# Patient Record
Sex: FEMALE | Race: WHITE | Employment: FULL TIME | URBAN - METROPOLITAN AREA
[De-identification: names, ages, dates, MRNs, and addresses within clinical notes are randomized per-mention and may not be internally consistent; named-entity substitution may affect disease eponyms.]

---

## 2022-03-01 LAB
ABO, EXTERNAL RESULT: NORMAL
HEP B, EXTERNAL RESULT: NEGATIVE
RH FACTOR, EXTERNAL RESULT: POSITIVE
RPR, EXTERNAL RESULT: NONREACTIVE
RUBELLA TITER, EXTERNAL RESULT: NORMAL

## 2022-03-04 ENCOUNTER — HOSPITAL ENCOUNTER (OUTPATIENT)
Dept: ULTRASOUND IMAGING | Age: 27
Discharge: HOME | End: 2022-03-04
Payer: COMMERCIAL

## 2022-03-04 DIAGNOSIS — O36.80X0 PREGNANCY WITH INCONCLUSIVE FETAL VIABILITY, NOT APPLICABLE OR UNSPECIFIED FETUS: ICD-10-CM

## 2022-03-04 PROCEDURE — 76817 TRANSVAGINAL US OBSTETRIC: CPT

## 2022-03-15 LAB
C. TRACHOMATIS, EXTERNAL RESULT: NEGATIVE
N. GONORRHOEAE, EXTERNAL RESULT: NEGATIVE

## 2022-04-02 ENCOUNTER — HOSPITAL ENCOUNTER (EMERGENCY)
Age: 27
Discharge: HOME | End: 2022-04-03
Attending: STUDENT IN AN ORGANIZED HEALTH CARE EDUCATION/TRAINING PROGRAM
Payer: COMMERCIAL

## 2022-04-02 DIAGNOSIS — O20.0 THREATENED MISCARRIAGE IN EARLY PREGNANCY: Primary | ICD-10-CM

## 2022-04-02 LAB
ABO/RH: NORMAL
ANION GAP SERPL CALCULATED.3IONS-SCNC: 13 MMOL/L (ref 3–16)
ANTIBODY SCREEN: NORMAL
BASOPHILS ABSOLUTE: 0 K/UL (ref 0–0.2)
BASOPHILS RELATIVE PERCENT: 0.2 %
BILIRUBIN URINE: NEGATIVE
BLOOD, URINE: ABNORMAL
BUN BLDV-MCNC: 7 MG/DL (ref 7–20)
CALCIUM SERPL-MCNC: 8.8 MG/DL (ref 8.3–10.6)
CHLORIDE BLD-SCNC: 104 MMOL/L (ref 99–110)
CLARITY: CLEAR
CO2: 21 MMOL/L (ref 21–32)
COLOR: YELLOW
CREAT SERPL-MCNC: <0.5 MG/DL (ref 0.6–1.1)
EOSINOPHILS ABSOLUTE: 0 K/UL (ref 0–0.6)
EOSINOPHILS RELATIVE PERCENT: 0.5 %
EPITHELIAL CELLS, UA: NORMAL /HPF (ref 0–5)
GFR AFRICAN AMERICAN: >60
GFR NON-AFRICAN AMERICAN: >60
GLUCOSE BLD-MCNC: 92 MG/DL (ref 70–99)
GLUCOSE URINE: NEGATIVE MG/DL
GONADOTROPIN, CHORIONIC (HCG) QUANT: NORMAL MIU/ML
HCT VFR BLD CALC: 34.8 % (ref 36–48)
HEMOGLOBIN: 11.7 G/DL (ref 12–16)
KETONES, URINE: NEGATIVE MG/DL
LEUKOCYTE ESTERASE, URINE: NEGATIVE
LYMPHOCYTES ABSOLUTE: 2 K/UL (ref 1–5.1)
LYMPHOCYTES RELATIVE PERCENT: 23.3 %
MCH RBC QN AUTO: 27.3 PG (ref 26–34)
MCHC RBC AUTO-ENTMCNC: 33.5 G/DL (ref 31–36)
MCV RBC AUTO: 81.5 FL (ref 80–100)
MICROSCOPIC EXAMINATION: YES
MONOCYTES ABSOLUTE: 0.5 K/UL (ref 0–1.3)
MONOCYTES RELATIVE PERCENT: 6.2 %
NEUTROPHILS ABSOLUTE: 6 K/UL (ref 1.7–7.7)
NEUTROPHILS RELATIVE PERCENT: 69.8 %
NITRITE, URINE: NEGATIVE
PDW BLD-RTO: 14.3 % (ref 12.4–15.4)
PH UA: 6 (ref 5–8)
PLATELET # BLD: 236 K/UL (ref 135–450)
PMV BLD AUTO: 8.6 FL (ref 5–10.5)
POTASSIUM REFLEX MAGNESIUM: 4 MMOL/L (ref 3.5–5.1)
PROTEIN UA: NEGATIVE MG/DL
RBC # BLD: 4.27 M/UL (ref 4–5.2)
RBC UA: NORMAL /HPF (ref 0–4)
SODIUM BLD-SCNC: 138 MMOL/L (ref 136–145)
SPECIFIC GRAVITY UA: 1.01 (ref 1–1.03)
URINE REFLEX TO CULTURE: ABNORMAL
URINE TYPE: ABNORMAL
UROBILINOGEN, URINE: 0.2 E.U./DL
WBC # BLD: 8.6 K/UL (ref 4–11)
WBC UA: NORMAL /HPF (ref 0–5)

## 2022-04-02 PROCEDURE — 86850 RBC ANTIBODY SCREEN: CPT

## 2022-04-02 PROCEDURE — 99283 EMERGENCY DEPT VISIT LOW MDM: CPT

## 2022-04-02 PROCEDURE — 85025 COMPLETE CBC W/AUTO DIFF WBC: CPT

## 2022-04-02 PROCEDURE — 84702 CHORIONIC GONADOTROPIN TEST: CPT

## 2022-04-02 PROCEDURE — 86901 BLOOD TYPING SEROLOGIC RH(D): CPT

## 2022-04-02 PROCEDURE — 80048 BASIC METABOLIC PNL TOTAL CA: CPT

## 2022-04-02 PROCEDURE — 86900 BLOOD TYPING SEROLOGIC ABO: CPT

## 2022-04-02 PROCEDURE — 81001 URINALYSIS AUTO W/SCOPE: CPT

## 2022-04-02 NOTE — Clinical Note
Simona Kim was seen and treated in our emergency department on 4/2/2022. She may return to work on 04/05/2022. If you have any questions or concerns, please don't hesitate to call.       Christie Lange MD

## 2022-04-03 VITALS
WEIGHT: 245 LBS | BODY MASS INDEX: 39.37 KG/M2 | DIASTOLIC BLOOD PRESSURE: 63 MMHG | TEMPERATURE: 98 F | OXYGEN SATURATION: 100 % | SYSTOLIC BLOOD PRESSURE: 121 MMHG | RESPIRATION RATE: 18 BRPM | HEIGHT: 66 IN | HEART RATE: 88 BPM

## 2022-04-03 ASSESSMENT — ENCOUNTER SYMPTOMS
NAUSEA: 0
COUGH: 0
SORE THROAT: 0
EYE PAIN: 0
VOMITING: 0
SHORTNESS OF BREATH: 0
ABDOMINAL PAIN: 0
DIARRHEA: 0
BACK PAIN: 0

## 2022-04-03 NOTE — ED NOTES
Call placed to [place] [address] [facility] @ [phone]  Re: vaginal bleeding < 20 wks pregnant per Dr. Marrian Bougie Dr. Carmina Landau @ [address]  04/02/22 2240

## 2022-04-03 NOTE — ED PROVIDER NOTES
[facility]  ED  EMERGENCY DEPARTMENT ENCOUNTER      Pt Name: Micheal Lynn  MRN: [id number]  [place] 1995  Date of evaluation: 4/2/2022  Provider: Antonio Jackson MD    CHIEF COMPLAINT       Chief Complaint   Patient presents with    Vaginal Bleeding     starting at 2030 tonight. gone through 2 pads. small clots. 1st pregnancy. 13 weeks pregnant. no cramping     Vaginal bleeding in pregnancy    HISTORY OF PRESENT ILLNESS   (Location/Symptom, Timing/Onset,Context/Setting, Quality, Duration, Modifying Factors, Severity)  Note limiting factors. Micheal Lynn is a 32 y.o. primigravida female who presents to the ED with a chief complaint of vaginal bleeding for the past 1 to 2 hours. Patient quantifies the bleeding as going through 2 pads in the past 2 hours, dark red blood, contain small clots. 13 weeks pregnant by LMP. Had her first trimester ultrasound which confirmed prior IUP. Denies nausea, vomiting, abdominal pain, fevers, dysuria, frequency, other urinary symptoms, diarrhea, constipation, lightheadedness, chest pain, shortness of breath. Symptoms not otherwise alleviated or exacerbated by other factors. NursingNotes were reviewed. REVIEW OF SYSTEMS    (2-9 systems for level 4, 10 or more for level 5)     Review of Systems   Constitutional: Negative for chills and fever. HENT: Negative for congestion and sore throat. Eyes: Negative for pain and visual disturbance. Respiratory: Negative for cough and shortness of breath. Cardiovascular: Negative for chest pain and palpitations. Gastrointestinal: Negative for abdominal pain, diarrhea, nausea and vomiting. Genitourinary: Positive for vaginal bleeding. Negative for difficulty urinating, dysuria, frequency, pelvic pain, urgency, vaginal discharge and vaginal pain. Musculoskeletal: Negative for back pain and neck pain. Skin: Negative for rash and wound.    Neurological: Negative for dizziness, weakness and light-headedness. PAST MEDICAL HISTORY   History reviewed. No pertinent past medical history. SURGICALHISTORY     History reviewed. No pertinent surgical history. CURRENT MEDICATIONS     There are no discharge medications for this patient. ALLERGIES     Other    FAMILY HISTORY     History reviewed. No pertinent family history. SOCIAL HISTORY       Social History     Socioeconomic History    Marital status: Single     Spouse name: None    Number of children: None    Years of education: None    Highest education level: None   Occupational History    None   Tobacco Use    Smoking status: Never Smoker    Smokeless tobacco: Never Used   Substance and Sexual Activity    Alcohol use: No    Drug use: No    Sexual activity: Never   Other Topics Concern    None   Social History Narrative    None     Social Determinants of Health     Financial Resource Strain:     Difficulty of Paying Living Expenses: Not on file   Food Insecurity:     Worried About Running Out of Food in the Last Year: Not on file    Tamica of Food in the Last Year: Not on file   Transportation Needs:     Lack of Transportation (Medical): Not on file    Lack of Transportation (Non-Medical):  Not on file   Physical Activity:     Days of Exercise per Week: Not on file    Minutes of Exercise per Session: Not on file   Stress:     Feeling of Stress : Not on file   Social Connections:     Frequency of Communication with Friends and Family: Not on file    Frequency of Social Gatherings with Friends and Family: Not on file    Attends Oriental orthodox Services: Not on file    Active Member of Clubs or Organizations: Not on file    Attends Club or Organization Meetings: Not on file    Marital Status: Not on file   Intimate Partner Violence:     Fear of Current or Ex-Partner: Not on file    Emotionally Abused: Not on file    Physically Abused: Not on file    Sexually Abused: Not on file   Housing Stability:     Unable to Pay for Housing in the Last Year: Not on file    Number of Places Lived in the Last Year: Not on file    Unstable Housing in the Last Year: Not on file       SCREENINGS    [place] Coma Scale  Eye Opening: Spontaneous  Best Verbal Response: Oriented  Best Motor Response: Obeys commands  [place] Coma Scale Score: 15        PHYSICAL EXAM    (up to 7 for level 4, 8 or more for level 5)     ED Triage Vitals [04/02/22 2116]   BP Temp Temp Source Pulse Resp SpO2 Height Weight   (!) 158/98 98 °F (36.7 °C) Oral 88 18 98 % 5' 6\" (1.676 m) 245 lb (111.1 kg)       General: Alert and oriented appropriately for age, No acute distress. Eye: Normal conjunctiva. Sclera anicteric. HENT: Oral mucosa is moist.  Respiratory: Respirations even and non-labored. Clear to auscultation bilaterally. Cardiovascular: Normal rate, Regular rhythm. Intact peripheral pulses. Gastrointestinal: Soft, Non-tender, Non-distended. Uterine palpable and fundus consistent with dates. : Normal external female genitalia. No blood present in the vaginal vault. Cervix is closed, thick and high. No CMT. No adnexal tenderness. Musculoskeletal: No swelling. Integumentary: Warm, Dry. Neurologic: Alert and appropriate for age. No focal deficits. Psychiatric: Cooperative. DIAGNOSTIC RESULTS       ED BEDSIDE ULTRASOUND:   Point of care pregnancy exam  Procedure note:  Indication: qualitative hCG positive, quantitative hCG positive, pregnant by history, vaginal bleeding    Views:  Transabdominal sagittal: Adequate  Transabdominal transverse: Adequate    Images obtained with findings:   Intrauterine pregnancy: Present  Fetus: Present  Fetal heart rate: 160 bpm  Fetal motion: Present    Impression:   Live intrauterine pregnancy: Present     Images obtained by me, interpreted by me. Images were saved to ultrasound machine.       LABS:  Labs Reviewed   CBC WITH AUTO DIFFERENTIAL - Abnormal; Notable for the following components:       Result Value Hemoglobin 11.7 (*)     Hematocrit 34.8 (*)     All other components within normal limits   BASIC METABOLIC PANEL W/ REFLEX TO MG FOR LOW K - Abnormal; Notable for the following components:    CREATININE <0.5 (*)     All other components within normal limits   URINALYSIS WITH REFLEX TO CULTURE - Abnormal; Notable for the following components:    Blood, Urine LARGE (*)     All other components within normal limits   HCG, QUANTITATIVE, PREGNANCY   MICROSCOPIC URINALYSIS   TYPE AND SCREEN       All other labs were within normal range or not returned as of this dictation. EMERGENCY DEPARTMENT COURSE and DIFFERENTIAL DIAGNOSIS/MDM:   Vitals:    Vitals:    22 2116 22 2154 22 2255 22 2353   BP: (!) 158/98 (!) 143/87 120/80 121/63   Pulse: 88      Resp: 18      Temp: 98 °F (36.7 °C)      TempSrc: Oral      SpO2: 98% 100% 99% 100%   Weight: 245 lb (111.1 kg)      Height: 5' 6\" (1.676 m)            Medical decision makin yo F with c/o painless vaginal bleeding at approximately 14 weeks pregnant. Concern for threatened . Patient has history of confirmed IUP. Unlikely ectopic. Bedside ultrasound confirms live caro intrauterine pregnancy with fetal heart rate of 160 bpm.  Not ectopic. Confirmed to be Rh+. Hemoglobin 11.7, stable, patient hemodynamically stable. Pelvic exam confirms no blood in the vaginal vault. Cervix closed. Presentation consistent with threatened . Given strict pelvic rest precautions, return precautions, instructions to follow-up with OB, spoke with patient's OB, Dr. Alesia Cook and arranged for close follow-up. Patient voiced understanding of this, return precautions given, she is stable for and amenable to discharge home. Discharged home, ambulated steadily from the emergency department upon discharge. CONSULTS:  IP CONSULT TO OB GYN          FINAL IMPRESSION      1.  Threatened miscarriage in early pregnancy          DISPOSITION/PLAN DISPOSITION Decision To Discharge 04/03/2022 12:03:00 AM      PATIENT REFERRED TO:  [facility]  ED  [address]  [address] [facility] [address]  [phone]    If symptoms worsen    Stephania Stein MD  [address]  [address] [facility]  [address] [facility] [address]  [phone]    On 4/4/2022        (Please note that portions of this note were completed with a voice recognition program.Efforts were made to edit the dictations but occasionally words are mis-transcribed.)    Antonio Jackson MD (electronically signed)  Attending Emergency Physician          Antonio Jackson MD  04/03/22 8909

## 2022-07-05 ENCOUNTER — OFFICE VISIT (OUTPATIENT)
Dept: FAMILY MEDICINE CLINIC | Age: 27
End: 2022-07-05
Payer: COMMERCIAL

## 2022-07-05 VITALS
DIASTOLIC BLOOD PRESSURE: 82 MMHG | WEIGHT: 260 LBS | HEIGHT: 66 IN | BODY MASS INDEX: 41.78 KG/M2 | HEART RATE: 90 BPM | OXYGEN SATURATION: 99 % | TEMPERATURE: 97.6 F | SYSTOLIC BLOOD PRESSURE: 132 MMHG

## 2022-07-05 DIAGNOSIS — O09.92 HIGH-RISK PREGNANCY IN SECOND TRIMESTER: ICD-10-CM

## 2022-07-05 DIAGNOSIS — E66.01 CLASS 3 SEVERE OBESITY DUE TO EXCESS CALORIES WITHOUT SERIOUS COMORBIDITY WITH BODY MASS INDEX (BMI) OF 40.0 TO 44.9 IN ADULT (HCC): Primary | ICD-10-CM

## 2022-07-05 PROCEDURE — 99203 OFFICE O/P NEW LOW 30 MIN: CPT | Performed by: NURSE PRACTITIONER

## 2022-07-05 ASSESSMENT — ENCOUNTER SYMPTOMS
CHEST TIGHTNESS: 0
CONSTIPATION: 0
BLOOD IN STOOL: 0
COUGH: 0
SHORTNESS OF BREATH: 0
DIARRHEA: 0

## 2022-07-05 ASSESSMENT — PATIENT HEALTH QUESTIONNAIRE - PHQ9
2. FEELING DOWN, DEPRESSED OR HOPELESS: 0
SUM OF ALL RESPONSES TO PHQ QUESTIONS 1-9: 0
SUM OF ALL RESPONSES TO PHQ9 QUESTIONS 1 & 2: 0
SUM OF ALL RESPONSES TO PHQ QUESTIONS 1-9: 0
1. LITTLE INTEREST OR PLEASURE IN DOING THINGS: 0

## 2022-07-05 NOTE — PROGRESS NOTES
7/5/2022    Chief Complaint   Patient presents with   Harika Davalos Doctor     has not seen pcp for almost 8 yrs, just wanting to get est, she is pregnant        Renetta Butts is a 32 y.o. female, presents today for:      ASSESSMENT/PLAN:    1. Class 3 severe obesity due to excess calories without serious comorbidity with body mass index (BMI) of 40.0 to 44.9 in adult [facility])  Monitor for now due to pregnancy    2. High-risk pregnancy in second trimester  Continue care with Alvaro Kitchen. FHT on ultrasound today. Encouraged healthy diet and daily exercise. Return if symptoms worsen or fail to improve. Presenting today to establish care. Friend recommended practice. Moved from [place] about a year ago. Currently 28 weeks pregnancy- high risk due to weight. Dr. Alvaro Kitchen is OB/ GYN at [facility]. This is her first pregnancy and she is having a girl. No dx of gestational diabetes. 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 She is feeling the baby move hourly. Unable to sleep on her back now. Currently . Trying to watch diet intake and walking daily. Having mild leg swelling due to pregnancy. Hx TB, needed to take 6 months of therapy. Always tests positive on skin tests. PHQ Scores 7/5/2022   PHQ2 Score 0   PHQ9 Score 0     Interpretation of Total Score Depression Severity: 1-4 = Minimal depression, 5-9 = Mild depression, 10-14 = Moderate depression, 15-19 = Moderately severe depression, 20-27 = Severe depression      Lab Results   Component Value Date     04/02/2022    K 4.0 04/02/2022     04/02/2022    CO2 21 04/02/2022    BUN 7 04/02/2022    CREATININE <0.5 (L) 04/02/2022    GLUCOSE 92 04/02/2022    CALCIUM 8.8 04/02/2022    LABGLOM >60 04/02/2022    GFRAA >60 04/02/2022         Review of Systems   Constitutional: Negative. Respiratory: Negative for cough, chest tightness and shortness of breath. Cardiovascular: Negative. Gastrointestinal: Negative for blood in stool, constipation and diarrhea. Genitourinary: Negative. Musculoskeletal: Negative. Skin: Negative. Neurological: Negative for dizziness, tremors, light-headedness and headaches. Hematological: Negative. Psychiatric/Behavioral: Negative for decreased concentration, dysphoric mood, self-injury, sleep disturbance and suicidal ideas. The patient is not nervous/anxious. Current Outpatient Medications on File Prior to Visit   Medication Sig Dispense Refill    Prenatal Vit-Fe Fumarate-FA (PRENATAL VITAMIN PO) Take 1 tablet by mouth daily      [DISCONTINUED] naproxen (NAPROSYN) 500 MG tablet Take 1 tablet by mouth 2 times daily. 30 tablet 0     No current facility-administered medications on file prior to visit. Allergies   Allergen Reactions    Other Hives     shellfish     Past Medical History:   Diagnosis Date    TB (tuberculosis)     completed 6 months oral therapy     Past Surgical History:   Procedure Laterality Date    ADENOIDECTOMY       Social History     Tobacco Use    Smoking status: Never Smoker    Smokeless tobacco: Never Used   Substance Use Topics    Alcohol use: No     History reviewed. No pertinent family history. Vitals:    07/05/22 0930   BP: 132/82   Pulse: 90   Temp: 97.6 °F (36.4 °C)   TempSrc: Infrared   SpO2: 99%   Weight: 260 lb (117.9 kg)   Height: 5' 5.5\" (1.664 m)     Estimated body mass index is 42.61 kg/m² as calculated from the following:    Height as of this encounter: 5' 5.5\" (1.664 m). Weight as of this encounter: 260 lb (117.9 kg). Physical Exam  Vitals reviewed. Constitutional:       Appearance: Normal appearance. HENT:      Head: Normocephalic.       Right Ear: Tympanic membrane and ear canal normal.      Left Ear: Tympanic membrane and ear canal normal.      Nose: Nose normal.      Mouth/Throat:      Mouth: Mucous membranes are moist.   Eyes:      Extraocular Movements: Extraocular movements intact. Conjunctiva/sclera: Conjunctivae normal.      Pupils: Pupils are equal, round, and reactive to light. Neck:      Vascular: No carotid bruit. Cardiovascular:      Rate and Rhythm: Normal rate and regular rhythm. Pulses: Normal pulses. Carotid pulses are 2+ on the right side and 2+ on the left side. Dorsalis pedis pulses are 2+ on the right side and 2+ on the left side. Posterior tibial pulses are 2+ on the right side and 2+ on the left side. Heart sounds: Normal heart sounds. No murmur heard. No gallop. Pulmonary:      Effort: Pulmonary effort is normal.      Breath sounds: Normal breath sounds. Abdominal:      General: Abdomen is flat. Palpations: Abdomen is soft. Tenderness: There is no abdominal tenderness. Hernia: No hernia is present. Comments: Fetal Heart tones on RLQ with ultrasound   Musculoskeletal:         General: Normal range of motion. Cervical back: Normal range of motion. Right lower leg: No edema. Left lower leg: No edema. Lymphadenopathy:      Cervical: No cervical adenopathy. Skin:     General: Skin is warm and dry. Capillary Refill: Capillary refill takes less than 2 seconds. Neurological:      General: No focal deficit present. Mental Status: She is alert and oriented to person, place, and time. Psychiatric:         Mood and Affect: Mood normal.         Behavior: Behavior normal.           Patient's questions answered and concerns addressed. Patient agrees to plan of care.         Electronically signed by RAJ Jo CNP on 7/5/2022 at 1:13 PM

## 2022-07-05 NOTE — ACP (ADVANCE CARE PLANNING)
Advance Care Planning     Advance Care Planning (ACP) Physician/NP/PA Conversation    Date of Conversation: 7/5/2022  Conducted with: Patient with Decision Making Capacity    Healthcare Decision Maker:      Primary Decision Maker: Rolly Ruiz - [phone]    Click here to complete Healthcare Decision Makers including selection of the Healthcare Decision Maker Relationship (ie \"Primary\")  Today we documented Decision Maker(s) consistent with Legal Next of Kin hierarchy. Care Preferences:    Hospitalization: \"If your health worsens and it becomes clear that your chance of recovery is unlikely, what would be your preference regarding hospitalization? \"  The patient would prefer hospitalization. Ventilation: \"If you were unable to breath on your own and your chance of recovery was unlikely, what would be your preference about the use of a ventilator (breathing machine) if it was available to you? \"  The patient is unsure. Resuscitation: \"In the event your heart stopped as a result of an underlying serious health condition, would you want attempts made to restart your heart, or would you prefer a natural death? \"  The patient is unsure.     treatment goals, benefit/burden of treatment options, artificial nutrition, ventilation preferences, hospitalization preferences, resuscitation preferences, end of life care preferences (vegetative state/imminent death) and hospice care    Conversation Outcomes / Follow-Up Plan:  ACP complete - no further action today  Reviewed DNR/DNI and patient elects Full Code (Attempt Resuscitation)    Length of Voluntary ACP Conversation in minutes:  <16 minutes (Non-Billable)    Prema Cook, APRN - CNP

## 2022-09-12 LAB — GBS, EXTERNAL RESULT: NEGATIVE

## 2022-09-19 ENCOUNTER — HOSPITAL ENCOUNTER (INPATIENT)
Age: 27
LOS: 2 days | Discharge: HOME | End: 2022-09-21
Attending: OBSTETRICS & GYNECOLOGY | Admitting: OBSTETRICS & GYNECOLOGY
Payer: COMMERCIAL

## 2022-09-19 ENCOUNTER — ANESTHESIA EVENT (OUTPATIENT)
Dept: LABOR AND DELIVERY | Age: 27
End: 2022-09-19
Payer: COMMERCIAL

## 2022-09-19 ENCOUNTER — ANESTHESIA (OUTPATIENT)
Dept: LABOR AND DELIVERY | Age: 27
End: 2022-09-19
Payer: COMMERCIAL

## 2022-09-19 DIAGNOSIS — Z98.891 S/P CESAREAN SECTION: Primary | ICD-10-CM

## 2022-09-19 PROBLEM — O13.3 GESTATIONAL HYPERTENSION W/O SIGNIFICANT PROTEINURIA IN 3RD TRIMESTER: Status: ACTIVE | Noted: 2022-09-19

## 2022-09-19 LAB
A/G RATIO: 1.1 (ref 1.1–2.2)
ABO/RH: NORMAL
ALBUMIN SERPL-MCNC: 3.6 G/DL (ref 3.4–5)
ALP BLD-CCNC: 119 U/L (ref 40–129)
ALT SERPL-CCNC: 6 U/L (ref 10–40)
AMPHETAMINE SCREEN, URINE: NORMAL
ANION GAP SERPL CALCULATED.3IONS-SCNC: 11 MMOL/L (ref 3–16)
ANTIBODY SCREEN: NORMAL
AST SERPL-CCNC: 15 U/L (ref 15–37)
BARBITURATE SCREEN URINE: NORMAL
BASOPHILS ABSOLUTE: 0 K/UL (ref 0–0.2)
BASOPHILS RELATIVE PERCENT: 0.1 %
BENZODIAZEPINE SCREEN, URINE: NORMAL
BILIRUB SERPL-MCNC: 0.3 MG/DL (ref 0–1)
BUN BLDV-MCNC: 7 MG/DL (ref 7–20)
BUPRENORPHINE URINE: NORMAL
CALCIUM SERPL-MCNC: 8.7 MG/DL (ref 8.3–10.6)
CANNABINOID SCREEN URINE: NORMAL
CHLORIDE BLD-SCNC: 100 MMOL/L (ref 99–110)
CO2: 21 MMOL/L (ref 21–32)
COCAINE METABOLITE SCREEN URINE: NORMAL
CREAT SERPL-MCNC: <0.5 MG/DL (ref 0.6–1.1)
CREATININE URINE: 85 MG/DL (ref 28–259)
EOSINOPHILS ABSOLUTE: 0.1 K/UL (ref 0–0.6)
EOSINOPHILS RELATIVE PERCENT: 0.5 %
FENTANYL SCREEN, URINE: NORMAL
GFR AFRICAN AMERICAN: >60
GFR NON-AFRICAN AMERICAN: >60
GLUCOSE BLD-MCNC: 91 MG/DL (ref 70–99)
HCT VFR BLD CALC: 34.2 % (ref 36–48)
HEMOGLOBIN: 11.3 G/DL (ref 12–16)
LYMPHOCYTES ABSOLUTE: 2.2 K/UL (ref 1–5.1)
LYMPHOCYTES RELATIVE PERCENT: 22.4 %
Lab: NORMAL
MCH RBC QN AUTO: 27.1 PG (ref 26–34)
MCHC RBC AUTO-ENTMCNC: 33.1 G/DL (ref 31–36)
MCV RBC AUTO: 82 FL (ref 80–100)
METHADONE SCREEN, URINE: NORMAL
MONOCYTES ABSOLUTE: 0.6 K/UL (ref 0–1.3)
MONOCYTES RELATIVE PERCENT: 6.4 %
NEUTROPHILS ABSOLUTE: 7 K/UL (ref 1.7–7.7)
NEUTROPHILS RELATIVE PERCENT: 70.6 %
OPIATE SCREEN URINE: NORMAL
OXYCODONE URINE: NORMAL
PDW BLD-RTO: 16.3 % (ref 12.4–15.4)
PH UA: 7
PHENCYCLIDINE SCREEN URINE: NORMAL
PLATELET # BLD: 226 K/UL (ref 135–450)
PMV BLD AUTO: 8.7 FL (ref 5–10.5)
POTASSIUM SERPL-SCNC: 3.9 MMOL/L (ref 3.5–5.1)
PROTEIN PROTEIN: 14 MG/DL
PROTEIN/CREAT RATIO: 0.2 MG/DL
RBC # BLD: 4.17 M/UL (ref 4–5.2)
SODIUM BLD-SCNC: 132 MMOL/L (ref 136–145)
TOTAL PROTEIN: 6.9 G/DL (ref 6.4–8.2)
WBC # BLD: 9.9 K/UL (ref 4–11)

## 2022-09-19 PROCEDURE — 6360000002 HC RX W HCPCS: Performed by: NURSE ANESTHETIST, CERTIFIED REGISTERED

## 2022-09-19 PROCEDURE — 2500000003 HC RX 250 WO HCPCS: Performed by: NURSE ANESTHETIST, CERTIFIED REGISTERED

## 2022-09-19 PROCEDURE — 36415 COLL VENOUS BLD VENIPUNCTURE: CPT

## 2022-09-19 PROCEDURE — 85025 COMPLETE CBC W/AUTO DIFF WBC: CPT

## 2022-09-19 PROCEDURE — 2580000003 HC RX 258: Performed by: OBSTETRICS & GYNECOLOGY

## 2022-09-19 PROCEDURE — 7100000000 HC PACU RECOVERY - FIRST 15 MIN: Performed by: OBSTETRICS & GYNECOLOGY

## 2022-09-19 PROCEDURE — 3700000000 HC ANESTHESIA ATTENDED CARE: Performed by: OBSTETRICS & GYNECOLOGY

## 2022-09-19 PROCEDURE — 80053 COMPREHEN METABOLIC PANEL: CPT

## 2022-09-19 PROCEDURE — 1220000000 HC SEMI PRIVATE OB R&B

## 2022-09-19 PROCEDURE — 3609079900 HC CESAREAN SECTION: Performed by: OBSTETRICS & GYNECOLOGY

## 2022-09-19 PROCEDURE — 3700000001 HC ADD 15 MINUTES (ANESTHESIA): Performed by: OBSTETRICS & GYNECOLOGY

## 2022-09-19 PROCEDURE — 82570 ASSAY OF URINE CREATININE: CPT

## 2022-09-19 PROCEDURE — 2709999900 HC NON-CHARGEABLE SUPPLY: Performed by: OBSTETRICS & GYNECOLOGY

## 2022-09-19 PROCEDURE — 86780 TREPONEMA PALLIDUM: CPT

## 2022-09-19 PROCEDURE — 6360000002 HC RX W HCPCS: Performed by: OBSTETRICS & GYNECOLOGY

## 2022-09-19 PROCEDURE — 84156 ASSAY OF PROTEIN URINE: CPT

## 2022-09-19 PROCEDURE — 86901 BLOOD TYPING SEROLOGIC RH(D): CPT

## 2022-09-19 PROCEDURE — 86850 RBC ANTIBODY SCREEN: CPT

## 2022-09-19 PROCEDURE — 7100000001 HC PACU RECOVERY - ADDTL 15 MIN: Performed by: OBSTETRICS & GYNECOLOGY

## 2022-09-19 PROCEDURE — 86900 BLOOD TYPING SEROLOGIC ABO: CPT

## 2022-09-19 PROCEDURE — 80307 DRUG TEST PRSMV CHEM ANLYZR: CPT

## 2022-09-19 RX ORDER — MORPHINE SULFATE 0.5 MG/ML
INJECTION, SOLUTION EPIDURAL; INTRATHECAL; INTRAVENOUS PRN
Status: DISCONTINUED | OUTPATIENT
Start: 2022-09-19 | End: 2022-09-28 | Stop reason: SDUPTHER

## 2022-09-19 RX ORDER — FAMOTIDINE 20 MG/1
20 TABLET, FILM COATED ORAL 2 TIMES DAILY
Status: DISCONTINUED | OUTPATIENT
Start: 2022-09-19 | End: 2022-09-21 | Stop reason: HOSPADM

## 2022-09-19 RX ORDER — IBUPROFEN 800 MG/1
800 TABLET ORAL EVERY 8 HOURS
Status: DISCONTINUED | OUTPATIENT
Start: 2022-09-20 | End: 2022-09-21 | Stop reason: HOSPADM

## 2022-09-19 RX ORDER — DIPHENHYDRAMINE HYDROCHLORIDE 50 MG/ML
25 INJECTION INTRAMUSCULAR; INTRAVENOUS EVERY 6 HOURS PRN
Status: DISCONTINUED | OUTPATIENT
Start: 2022-09-19 | End: 2022-09-21 | Stop reason: HOSPADM

## 2022-09-19 RX ORDER — ONDANSETRON 2 MG/ML
4 INJECTION INTRAMUSCULAR; INTRAVENOUS EVERY 6 HOURS PRN
Status: DISCONTINUED | OUTPATIENT
Start: 2022-09-19 | End: 2022-09-21 | Stop reason: HOSPADM

## 2022-09-19 RX ORDER — LANOLIN 100 %
OINTMENT (GRAM) TOPICAL
Status: DISCONTINUED | OUTPATIENT
Start: 2022-09-19 | End: 2022-09-21 | Stop reason: HOSPADM

## 2022-09-19 RX ORDER — CEPHALEXIN 250 MG/1
500 CAPSULE ORAL EVERY 8 HOURS SCHEDULED
Status: DISCONTINUED | OUTPATIENT
Start: 2022-09-19 | End: 2022-09-21 | Stop reason: HOSPADM

## 2022-09-19 RX ORDER — OXYTOCIN 10 [USP'U]/ML
INJECTION, SOLUTION INTRAMUSCULAR; INTRAVENOUS PRN
Status: DISCONTINUED | OUTPATIENT
Start: 2022-09-19 | End: 2022-09-28 | Stop reason: SDUPTHER

## 2022-09-19 RX ORDER — SODIUM CHLORIDE, SODIUM LACTATE, POTASSIUM CHLORIDE, CALCIUM CHLORIDE 600; 310; 30; 20 MG/100ML; MG/100ML; MG/100ML; MG/100ML
INJECTION, SOLUTION INTRAVENOUS CONTINUOUS
Status: DISCONTINUED | OUTPATIENT
Start: 2022-09-19 | End: 2022-09-19

## 2022-09-19 RX ORDER — FAMOTIDINE 10 MG/ML
INJECTION, SOLUTION INTRAVENOUS PRN
Status: DISCONTINUED | OUTPATIENT
Start: 2022-09-19 | End: 2022-09-28 | Stop reason: SDUPTHER

## 2022-09-19 RX ORDER — OXYCODONE HYDROCHLORIDE 5 MG/1
5 TABLET ORAL EVERY 4 HOURS PRN
Status: DISCONTINUED | OUTPATIENT
Start: 2022-09-19 | End: 2022-09-21 | Stop reason: HOSPADM

## 2022-09-19 RX ORDER — ACETAMINOPHEN 500 MG
1000 TABLET ORAL EVERY 8 HOURS
Status: DISCONTINUED | OUTPATIENT
Start: 2022-09-19 | End: 2022-09-21 | Stop reason: HOSPADM

## 2022-09-19 RX ORDER — POLYETHYLENE GLYCOL 3350 17 G/17G
17 POWDER, FOR SOLUTION ORAL DAILY
Status: DISCONTINUED | OUTPATIENT
Start: 2022-09-19 | End: 2022-09-21 | Stop reason: HOSPADM

## 2022-09-19 RX ORDER — EPHEDRINE SULFATE 50 MG/ML
INJECTION INTRAVENOUS PRN
Status: DISCONTINUED | OUTPATIENT
Start: 2022-09-19 | End: 2022-09-28 | Stop reason: SDUPTHER

## 2022-09-19 RX ORDER — TRISODIUM CITRATE DIHYDRATE AND CITRIC ACID MONOHYDRATE 500; 334 MG/5ML; MG/5ML
30 SOLUTION ORAL ONCE
Status: DISCONTINUED | OUTPATIENT
Start: 2022-09-19 | End: 2022-09-19

## 2022-09-19 RX ORDER — ASPIRIN 81 MG/1
81 TABLET, CHEWABLE ORAL DAILY
Status: ON HOLD | COMMUNITY
End: 2022-09-21 | Stop reason: HOSPADM

## 2022-09-19 RX ORDER — SODIUM CHLORIDE, SODIUM LACTATE, POTASSIUM CHLORIDE, AND CALCIUM CHLORIDE .6; .31; .03; .02 G/100ML; G/100ML; G/100ML; G/100ML
1000 INJECTION, SOLUTION INTRAVENOUS ONCE
Status: DISCONTINUED | OUTPATIENT
Start: 2022-09-19 | End: 2022-09-19

## 2022-09-19 RX ORDER — KETOROLAC TROMETHAMINE 30 MG/ML
30 INJECTION, SOLUTION INTRAMUSCULAR; INTRAVENOUS ONCE
Status: COMPLETED | OUTPATIENT
Start: 2022-09-19 | End: 2022-09-19

## 2022-09-19 RX ORDER — FAMOTIDINE 10 MG/ML
INJECTION, SOLUTION INTRAVENOUS
Status: COMPLETED
Start: 2022-09-19 | End: 2022-09-19

## 2022-09-19 RX ORDER — SODIUM CHLORIDE, SODIUM LACTATE, POTASSIUM CHLORIDE, CALCIUM CHLORIDE 600; 310; 30; 20 MG/100ML; MG/100ML; MG/100ML; MG/100ML
INJECTION, SOLUTION INTRAVENOUS CONTINUOUS
Status: ACTIVE | OUTPATIENT
Start: 2022-09-19 | End: 2022-09-20

## 2022-09-19 RX ORDER — ONDANSETRON 2 MG/ML
INJECTION INTRAMUSCULAR; INTRAVENOUS
Status: COMPLETED
Start: 2022-09-19 | End: 2022-09-19

## 2022-09-19 RX ORDER — BUPIVACAINE HYDROCHLORIDE 7.5 MG/ML
INJECTION, SOLUTION INTRASPINAL PRN
Status: DISCONTINUED | OUTPATIENT
Start: 2022-09-19 | End: 2022-09-28 | Stop reason: SDUPTHER

## 2022-09-19 RX ORDER — METRONIDAZOLE 250 MG/1
500 TABLET ORAL EVERY 8 HOURS SCHEDULED
Status: DISCONTINUED | OUTPATIENT
Start: 2022-09-19 | End: 2022-09-21 | Stop reason: HOSPADM

## 2022-09-19 RX ORDER — SODIUM CHLORIDE 0.9 % (FLUSH) 0.9 %
5-40 SYRINGE (ML) INJECTION EVERY 12 HOURS SCHEDULED
Status: DISCONTINUED | OUTPATIENT
Start: 2022-09-19 | End: 2022-09-21 | Stop reason: HOSPADM

## 2022-09-19 RX ORDER — SODIUM CHLORIDE, SODIUM LACTATE, POTASSIUM CHLORIDE, CALCIUM CHLORIDE 600; 310; 30; 20 MG/100ML; MG/100ML; MG/100ML; MG/100ML
INJECTION, SOLUTION INTRAVENOUS CONTINUOUS
Status: DISCONTINUED | OUTPATIENT
Start: 2022-09-19 | End: 2022-09-19 | Stop reason: SDUPTHER

## 2022-09-19 RX ORDER — BISACODYL 10 MG
10 SUPPOSITORY, RECTAL RECTAL DAILY PRN
Status: DISCONTINUED | OUTPATIENT
Start: 2022-09-19 | End: 2022-09-21 | Stop reason: HOSPADM

## 2022-09-19 RX ORDER — ONDANSETRON 2 MG/ML
INJECTION INTRAMUSCULAR; INTRAVENOUS PRN
Status: DISCONTINUED | OUTPATIENT
Start: 2022-09-19 | End: 2022-09-19 | Stop reason: SDUPTHER

## 2022-09-19 RX ORDER — DOCUSATE SODIUM 100 MG/1
100 CAPSULE, LIQUID FILLED ORAL 2 TIMES DAILY
Status: DISCONTINUED | OUTPATIENT
Start: 2022-09-19 | End: 2022-09-21 | Stop reason: HOSPADM

## 2022-09-19 RX ADMIN — OXYTOCIN 20 UNITS: 10 INJECTION INTRAVENOUS at 17:22

## 2022-09-19 RX ADMIN — KETOROLAC TROMETHAMINE 30 MG: 30 INJECTION, SOLUTION INTRAMUSCULAR at 21:23

## 2022-09-19 RX ADMIN — EPHEDRINE SULFATE 20 MG: 50 INJECTION INTRAVENOUS at 17:04

## 2022-09-19 RX ADMIN — SODIUM CHLORIDE, SODIUM LACTATE, POTASSIUM CHLORIDE, AND CALCIUM CHLORIDE: .6; .31; .03; .02 INJECTION, SOLUTION INTRAVENOUS at 17:05

## 2022-09-19 RX ADMIN — SODIUM CHLORIDE, SODIUM LACTATE, POTASSIUM CHLORIDE, AND CALCIUM CHLORIDE: .6; .31; .03; .02 INJECTION, SOLUTION INTRAVENOUS at 17:36

## 2022-09-19 RX ADMIN — EPHEDRINE SULFATE 10 MG: 50 INJECTION INTRAVENOUS at 17:06

## 2022-09-19 RX ADMIN — BUPIVACAINE HYDROCHLORIDE 1.6 ML: 7.5 INJECTION, SOLUTION SUBARACHNOID at 16:50

## 2022-09-19 RX ADMIN — Medication 87.3 MILLI-UNITS/MIN: at 18:09

## 2022-09-19 RX ADMIN — ONDANSETRON 4 MG: 2 INJECTION INTRAMUSCULAR; INTRAVENOUS at 16:20

## 2022-09-19 RX ADMIN — SODIUM CHLORIDE, SODIUM LACTATE, POTASSIUM CHLORIDE, AND CALCIUM CHLORIDE: .6; .31; .03; .02 INJECTION, SOLUTION INTRAVENOUS at 13:40

## 2022-09-19 RX ADMIN — EPHEDRINE SULFATE 10 MG: 50 INJECTION INTRAVENOUS at 16:56

## 2022-09-19 RX ADMIN — MORPHINE SULFATE 0.15 MG: 0.5 INJECTION EPIDURAL; INTRATHECAL; INTRAVENOUS at 16:50

## 2022-09-19 RX ADMIN — FAMOTIDINE 20 MG: 10 INJECTION, SOLUTION INTRAVENOUS at 16:20

## 2022-09-19 RX ADMIN — CEFAZOLIN 2 G: 10 INJECTION, POWDER, FOR SOLUTION INTRAVENOUS at 16:41

## 2022-09-19 RX ADMIN — EPHEDRINE SULFATE 10 MG: 50 INJECTION INTRAVENOUS at 17:12

## 2022-09-19 RX ADMIN — OXYTOCIN 10 UNITS: 10 INJECTION INTRAVENOUS at 17:37

## 2022-09-19 RX ADMIN — SODIUM CHLORIDE, SODIUM LACTATE, POTASSIUM CHLORIDE, AND CALCIUM CHLORIDE: .6; .31; .03; .02 INJECTION, SOLUTION INTRAVENOUS at 16:25

## 2022-09-19 ASSESSMENT — PAIN DESCRIPTION - DESCRIPTORS: DESCRIPTORS: DISCOMFORT;BURNING

## 2022-09-19 ASSESSMENT — PAIN SCALES - GENERAL: PAINLEVEL_OUTOF10: 4

## 2022-09-19 ASSESSMENT — PAIN DESCRIPTION - LOCATION: LOCATION: INCISION

## 2022-09-19 ASSESSMENT — PAIN - FUNCTIONAL ASSESSMENT: PAIN_FUNCTIONAL_ASSESSMENT: ACTIVITIES ARE NOT PREVENTED

## 2022-09-19 NOTE — ANESTHESIA PRE PROCEDURE
Department of Anesthesiology  Preprocedure Note       Name:  Maddison Sargent   Age:  32 y.o.  :  1995                                          MRN:  [id number]         Date:  2022      Surgeon: Jessie Mohs):  Pily Clements MD    Procedure: Procedure(s):   SECTION    Medications prior to admission:   Prior to Admission medications    Medication Sig Start Date End Date Taking? Authorizing Provider   aspirin 81 MG chewable tablet Take 81 mg by mouth daily   Yes Historical Provider, MD   Prenatal Vit-Fe Fumarate-FA (PRENATAL VITAMIN PO) Take 1 tablet by mouth daily    Historical Provider, MD   naproxen (NAPROSYN) 500 MG tablet Take 1 tablet by mouth 2 times daily.  10/17/12 4/3/22  Randy Krishnan MD       Current medications:    Current Facility-Administered Medications   Medication Dose Route Frequency Provider Last Rate Last Admin    lactated ringers infusion   IntraVENous Continuous Pily Clements MD   New Bag at 22 1705    lactated ringers bolus  1,000 mL IntraVENous Once Pily Clements MD        citric acid-sodium citrate (BICITRA) solution 30 mL  30 mL Oral Once Pily Clements MD        oxytocin (PITOCIN) 30 units in 500 mL infusion  87.3 obdulia-units/min IntraVENous Continuous PRN Pily Clements MD        And    oxytocin (PITOCIN) 10 unit bolus from the bag  10 Units IntraVENous PRN Pily Clements MD         Facility-Administered Medications Ordered in Other Encounters   Medication Dose Route Frequency Provider Last Rate Last Admin    ePHEDrine injection   IntraVENous PRN Carvel Pro, APRN - CRNA   10 mg at 22 1706    morphine (PF) [facility]) injection   Intrathecal PRN Carvel Pro, APRN - CRNA   0.15 mg at 22 1650    bupivacaine 0.75% in dextrose 8.25% (intrathecal) (SENSORCAINE) 0.75-8.25 % injection   Spinal/Regional PRN Carvel Pro, APRN - CRNA   1.6 mL at 22 1650    oxytocin (PITOCIN) injection   IntraVENous PRN Carvel Pro, APRN - CRNA   20 Units at 22 1722  ondansetron (ZOFRAN) injection   IntraVENous PRN Maeve Rye, APRN - CRNA   4 mg at 09/19/22 1620    famotidine (PEPCID) injection   IntraVENous PRN Maeve Rye, APRN - CRNA   20 mg at 09/19/22 1620       Allergies: Allergies   Allergen Reactions    Other Hives     shellfish       Problem List:    Patient Active Problem List   Diagnosis Code    Gestational hypertension w/o significant proteinuria in 3rd trimester O13.3       Past Medical History:        Diagnosis Date    Abnormal Pap smear of cervix     Anemia     TB (tuberculosis)     completed 6 months oral therapy       Past Surgical History:        Procedure Laterality Date    ADENOIDECTOMY         Social History:    Social History     Tobacco Use    Smoking status: Never    Smokeless tobacco: Never   Substance Use Topics    Alcohol use: No                                Counseling given: Not Answered      Vital Signs (Current):   Vitals:    09/19/22 1445 09/19/22 1503 09/19/22 1515 09/19/22 1545   BP: 138/76 (!) 146/87 (!) 151/100 (!) 147/88   Pulse: 88 94 97 96   Resp:  18     SpO2:    100%   Weight:       Height:                                                  BP Readings from Last 3 Encounters:   09/19/22 (!) 147/88   07/05/22 132/82   04/02/22 121/63       NPO Status: Time of last liquid consumption: 0900                        Time of last solid consumption: 0900                        Date of last liquid consumption: 09/19/22                        Date of last solid food consumption: 09/19/22    BMI:   Wt Readings from Last 3 Encounters:   09/19/22 275 lb (124.7 kg)   07/05/22 260 lb (117.9 kg)   04/02/22 245 lb (111.1 kg)     Body mass index is 44.39 kg/m².     CBC:   Lab Results   Component Value Date/Time    WBC 9.9 09/19/2022 11:25 AM    RBC 4.17 09/19/2022 11:25 AM    HGB 11.3 09/19/2022 11:25 AM    HCT 34.2 09/19/2022 11:25 AM    MCV 82.0 09/19/2022 11:25 AM    RDW 16.3 09/19/2022 11:25 AM     09/19/2022 11:25 AM       CMP: Lab Results   Component Value Date/Time     09/19/2022 11:25 AM    K 3.9 09/19/2022 11:25 AM    K 4.0 04/02/2022 10:19 PM     09/19/2022 11:25 AM    CO2 21 09/19/2022 11:25 AM    BUN 7 09/19/2022 11:25 AM    CREATININE <0.5 09/19/2022 11:25 AM    GFRAA >60 09/19/2022 11:25 AM    AGRATIO 1.1 09/19/2022 11:25 AM    LABGLOM >60 09/19/2022 11:25 AM    GLUCOSE 91 09/19/2022 11:25 AM    PROT 6.9 09/19/2022 11:25 AM    CALCIUM 8.7 09/19/2022 11:25 AM    BILITOT 0.3 09/19/2022 11:25 AM    ALKPHOS 119 09/19/2022 11:25 AM    AST 15 09/19/2022 11:25 AM    ALT 6 09/19/2022 11:25 AM       POC Tests: No results for input(s): POCGLU, POCNA, POCK, POCCL, POCBUN, POCHEMO, POCHCT in the last 72 hours. Coags: No results found for: PROTIME, INR, APTT    HCG (If Applicable): No results found for: PREGTESTUR, PREGSERUM, HCG, HCGQUANT     ABGs: No results found for: PHART, PO2ART, PPZ6XDT, EPH8SFV, BEART, Q1SVFXEQ     Type & Screen (If Applicable):  No results found for: LABABO, LABRH    Drug/Infectious Status (If Applicable):  No results found for: HIV, HEPCAB    COVID-19 Screening (If Applicable): No results found for: COVID19        Anesthesia Evaluation  Patient summary reviewed and Nursing notes reviewed no history of anesthetic complications:   Airway: Mallampati: II  TM distance: >3 FB   Neck ROM: full  Mouth opening: > = 3 FB   Dental:          Pulmonary:Negative Pulmonary ROS                              Cardiovascular:    (+) hypertension:,                   Neuro/Psych:   Negative Neuro/Psych ROS              GI/Hepatic/Renal:   (+) GERD: no interval change, morbid obesity          Endo/Other:                     Abdominal:   (+) obese,           Vascular: negative vascular ROS. Other Findings:           Anesthesia Plan      spinal     ASA 3 - emergent             Anesthetic plan and risks discussed with patient. Plan discussed with attending.           Post-op pain plan if not by surgeon: intrathecal narcotics      Discussed risks and benefits of SAB with Duramorph for post op pain control. She agrees with plan. Questions answered.       Maeve Winchester, APRN - CRNA   9/19/2022

## 2022-09-19 NOTE — PLAN OF CARE
Problem: Safety - Adult  Goal: Free from fall injury  Flowsheets (Taken 9/19/2022 1631)  Free From Fall Injury: Instruct family/caregiver on patient safety

## 2022-09-19 NOTE — H&P
Department of Obstetrics and Gynecology  Attending Obstetrics History and Physical        CHIEF COMPLAINT:  hypertension    HISTORY OF PRESENT ILLNESS:      The patient is a 32 y.o. y.o.   at 37w2d by LMP confirmed by 9w1d ultrasound, [facility] 10/8/2022 is admitted for elective primary  delivery in the setting of gestational hypertension. Patient reports no headache, no vision changes, no RUQ pain. Other pregnancy risk factors include: BMI > 40, rubella nonimmunity, anemia, large for gestational age fetus with EFW 3884gm. + fetal movement, no leakage of fluid, no vaginal bleeding, no contractions, consistent with labor. PRENATAL CARE:    [facility]    Blood Type/Rh:  A POS    Antibody Screen:  Neg  Rubella:  Immune  RPR:  NR  Hepatitis B Surface Antigen: Neg  HIV:  Neg  Gonorrhea:  Neg  Chlamydia:  Neg  1 hour Glucose Tolerance Test:  139, 3hr GTT 76/121/141/112  Group B Strep:  negative      REVIEW OF SYSTEMS:    CONSTITUTIONAL:  negative for  fevers and sweats  NEURO: no headache, no vision changes  RESPIRATORY:  negative for dyspnea  CARDIOVASCULAR:  negative for  chest pain  GASTROINTESTINAL:  negative for nausea, vomiting and change in bowel habits, no RUQ pain  GENITOURINARY:  negative for frequency and dysuria  MUSCULOSKELETAL:  negative for  myalgias    PHYSICAL EXAM:  Vitals:    22 1431 22 1445 22 1503 22 1515   BP: 129/83 138/76 (!) 146/87 (!) 151/100   Pulse: (!) 102 88 94 97   Resp:   18    Weight:       Height:           CONSTITUTIONAL:  awake, alert, cooperative, no apparent distress, and appears stated age  ABDOMEN:  Gravid, non tender  MUSCULOSKELETAL:  Full range of motion  Fetal heart rate: CAT 1, reassuring, 120 bpm, moderate variability, +accels.   REACTIVE NST    General Labs:    WBC/Hgb/Hct/Plts:  9.9/11.3/34.2/226 (1125)     Lab Results   Component Value Date    CREATININE <0.5 (L) 2022    BUN 7 2022     (L) 2022 K 3.9 2022     2022    CO2 21 2022     Lab Results   Component Value Date/Time    ALKPHOS 119 2022 11:25 AM    ALT 6 2022 11:25 AM    AST 15 2022 11:25 AM    PROT 6.9 2022 11:25 AM    BILITOT 0.3 2022 11:25 AM    LABALBU 3.6 2022 11:25 AM       ASSESSMENT AND PLAN:    32 y.o. y.o.   at 37w2d    Principal Problem:  -gestational hypertension  -Reassuring fetal status    Plan:   1. Admit to L&D for delivery  2. Admission labs drawn  3. Patient was counseled on plan of care including risks and expectations. 4. Proceed to OR for  delivery when OR team is ready.     Donaldo Taylor MD

## 2022-09-19 NOTE — FLOWSHEET NOTE
Patient presents to triage from office with having increased bp's. Placed on EFM, serial bp's started and labs sent.   Will continue to monitor and update Dr Yuly Epstein

## 2022-09-19 NOTE — LACTATION NOTE
This note was copied from a baby's chart. Lactation Progress Note      Data:  RN  requests initial consult to assist with first feeding and latch after primary c/s delivery for [facility] who delivered LGA infant at 37.2 weeks gestation. Action: Introduced self as [address] on for this evening and offered much support. Gentle assistance provided in laid back positioning as infant crawls to the breast. Gently guiding baby down towards the breast. Infant rooting with wide open mouth and obtained LEONARDO with self attachment to the breast after few attempts of head bobbing with SRS and AS. Mother confirms the latch is comfortable. Shown reassuring signs of LEONARDO, educating on the importance of LEONARDO, and explaining how a good latch should look and feel. Shown reassuring signs of effective feeding and milk transfer. Instructed on the importance to use finger in the corner of infant's mouth to break the suction of the latch if baby ever latches shallow, or if the latch is pinching or causing discomfort. Explained that nipple should be rounded without creasing or compression with release from the breast. Breast feeding guide booklet provided and reviewed benefits of exclusive breast feeding, how milk production works, and types of milk mother will produce. Educated on what to expect with breast feeding  over the first 24  hours of life including breast care, signs of hunger/satiety, colostrum, expected  feeding behaviors, size of infant belly and how to know baby is getting enough at the breast including daily feeding and output goals, and anticipated weight trends. Encouraged to offer the breast when infant first begins to wake and showing early hunger cues, and every 2-3 hours if baby is sleepy and without feeding cues. Gave tips to encourage waking infant and LEONARDO to the breast. Reassured sleepy behavior is common on the first DOL as baby recovers from birth.  Educated on risks related to offering bottles, formula supplements, and pacifiers, and encouraged to wait to pump for the first couple of weeks to establish latching and a good milk supply unless medical indication were to arise sooner. Infant continues nursing well. Reviewed signs of satiety and when to know infant is finished, burping, and when to alternate breasts and offer the breast again. Name and number provided on whiteboard. Encouraged to call for f/u support prn. Response: Verbalized understanding of teaching provided. Pleased with comfortable latch. Will call for f/u support prn.

## 2022-09-19 NOTE — OP NOTE
Operative Note      Patient: Juanito Lee  YOB: 1995  MRN: [id number]    Date of Procedure: 2022    Pre-Op Diagnosis: gestational hypertension, large for gestational age, patient request for elective primary . Post-Op Diagnosis: Same       Procedure(s):   SECTION    Surgeon(s):  Cesia Tirado MD    Anesthesia: Spinal    Estimated Blood Loss (mL): 329 ml    Complications: None      Drains:   Urinary Catheter 22 Clemente (Active)       Findings: viable female infant, APGAR 8/9, birth weight 3620 gm    Detailed Description of Procedure:   Patient was taken to the operating room where she received spinal anesthesia. She was placed in the supine position with left lateral tilt  Clemente catheter was placed. Abdomen was prepped and draped in usual sterile fashion. Allis clamp was used to confirm adequate anesthesia. Time out was performed. A transverse incision was made approximately 2cm above the pubic symphysis and was taken down to the level of the fascia with a scalpel. The fascia was scored with the scalpel. The incisions were extended laterally with Cueva scissors. Kocher clamps were used to grasp the superior edge of the fascia and the rectus muscles were bluntly and sharply dissected. Kochers were moved to inferior edge of fascia and the muscles were dissected bluntly and sharply. The rectus muscles were  in the midline and the peritoneum was entered bluntly. Bladder blade was placed. A transverse incision was made in the lower uterus. Amniotic sac was opened bluntly and the fluid was clear. The vertex was rotated to OA but continued to rotate back to OT position during delivery. Kiwi vacuum was placed on fetal head and head delivered with gentle traction, followed by anterior and posterior shoulders, then the rest of the infant. There was a spontaneous cry and movement as the cord was double clamped and cut.   The infant was handed to the awaiting nurse.  Placenta was delivered with gentle traction on umbilical cord. The uterus was cleared of clot and remaining membrane. Uterus was delivered through the abdominal incision. The uterine incision was closed with 0-Vicryl in a running, locked stitch, followed by an imbricating layer and additional figure of eight stitches in midline and left edge of incision. Hemostasis was confirmed. The ovaries and Fallopian tubes appeared normal.  Uterus was placed back in the abdominal cavity. The bilateral adnexal areas were inspected and found to be clear of blood clots. Bladder blade was placed and incision was confirmed to be hemostatic. Peritoneum was closed with 3-0 Vicryl in a running stitch. Fascia was closed with 0-Vicryl in a running stitch. The subcutaneous layer was irrigated and suctioned. Bovie cautery was used for small capillary bleeding. Skin was closed with 4-0 Vicryl in a subcuticular stitch. Incision was covered with telfa and bandage. Patient was taken to her room for recovery with infant, both in good condition. She and baby tolerated procedure well.       Electronically signed by Schuyler Vale MD on 9/19/2022 at 5:51 PM

## 2022-09-20 PROBLEM — Z98.891 S/P CESAREAN SECTION: Status: ACTIVE | Noted: 2022-09-20

## 2022-09-20 LAB
HCT VFR BLD CALC: 27.2 % (ref 36–48)
HEMOGLOBIN: 8.9 G/DL (ref 12–16)
MCH RBC QN AUTO: 26.7 PG (ref 26–34)
MCHC RBC AUTO-ENTMCNC: 32.6 G/DL (ref 31–36)
MCV RBC AUTO: 81.8 FL (ref 80–100)
PDW BLD-RTO: 16.4 % (ref 12.4–15.4)
PLATELET # BLD: 176 K/UL (ref 135–450)
PMV BLD AUTO: 8.9 FL (ref 5–10.5)
RBC # BLD: 3.32 M/UL (ref 4–5.2)
TOTAL SYPHILLIS IGG/IGM: NORMAL
WBC # BLD: 11.7 K/UL (ref 4–11)

## 2022-09-20 PROCEDURE — 85027 COMPLETE CBC AUTOMATED: CPT

## 2022-09-20 PROCEDURE — 36415 COLL VENOUS BLD VENIPUNCTURE: CPT

## 2022-09-20 PROCEDURE — 1220000000 HC SEMI PRIVATE OB R&B

## 2022-09-20 PROCEDURE — 6370000000 HC RX 637 (ALT 250 FOR IP): Performed by: OBSTETRICS & GYNECOLOGY

## 2022-09-20 RX ORDER — FERROUS SULFATE 325(65) MG
325 TABLET ORAL 2 TIMES DAILY WITH MEALS
Status: DISCONTINUED | OUTPATIENT
Start: 2022-09-20 | End: 2022-09-21 | Stop reason: HOSPADM

## 2022-09-20 RX ADMIN — DOCUSATE SODIUM 100 MG: 100 CAPSULE, LIQUID FILLED ORAL at 08:33

## 2022-09-20 RX ADMIN — IBUPROFEN 800 MG: 800 TABLET, FILM COATED ORAL at 12:37

## 2022-09-20 RX ADMIN — DOCUSATE SODIUM 100 MG: 100 CAPSULE, LIQUID FILLED ORAL at 22:04

## 2022-09-20 RX ADMIN — ACETAMINOPHEN 1000 MG: 500 TABLET ORAL at 00:00

## 2022-09-20 RX ADMIN — ACETAMINOPHEN 1000 MG: 500 TABLET ORAL at 08:33

## 2022-09-20 RX ADMIN — IBUPROFEN 800 MG: 800 TABLET, FILM COATED ORAL at 04:28

## 2022-09-20 RX ADMIN — CEPHALEXIN 500 MG: 250 CAPSULE ORAL at 00:17

## 2022-09-20 RX ADMIN — METRONIDAZOLE 500 MG: 250 TABLET ORAL at 22:03

## 2022-09-20 RX ADMIN — IBUPROFEN 800 MG: 800 TABLET, FILM COATED ORAL at 22:03

## 2022-09-20 RX ADMIN — FERROUS SULFATE TAB 325 MG (65 MG ELEMENTAL FE) 325 MG: 325 (65 FE) TAB at 16:45

## 2022-09-20 RX ADMIN — ACETAMINOPHEN 1000 MG: 500 TABLET ORAL at 16:45

## 2022-09-20 RX ADMIN — METRONIDAZOLE 500 MG: 250 TABLET ORAL at 14:18

## 2022-09-20 RX ADMIN — METRONIDAZOLE 500 MG: 250 TABLET ORAL at 00:17

## 2022-09-20 RX ADMIN — CEPHALEXIN 500 MG: 250 CAPSULE ORAL at 14:17

## 2022-09-20 RX ADMIN — CEPHALEXIN 500 MG: 250 CAPSULE ORAL at 22:02

## 2022-09-20 ASSESSMENT — PAIN DESCRIPTION - DESCRIPTORS
DESCRIPTORS: BURNING
DESCRIPTORS: SORE
DESCRIPTORS: BURNING
DESCRIPTORS: BURNING

## 2022-09-20 ASSESSMENT — PAIN - FUNCTIONAL ASSESSMENT
PAIN_FUNCTIONAL_ASSESSMENT: ACTIVITIES ARE NOT PREVENTED
PAIN_FUNCTIONAL_ASSESSMENT: ACTIVITIES ARE NOT PREVENTED

## 2022-09-20 ASSESSMENT — PAIN DESCRIPTION - LOCATION
LOCATION: ABDOMEN;INCISION
LOCATION: ABDOMEN;INCISION
LOCATION: INCISION
LOCATION: ABDOMEN;INCISION

## 2022-09-20 ASSESSMENT — PAIN DESCRIPTION - ORIENTATION
ORIENTATION: LOWER

## 2022-09-20 ASSESSMENT — PAIN SCALES - GENERAL
PAINLEVEL_OUTOF10: 3
PAINLEVEL_OUTOF10: 3
PAINLEVEL_OUTOF10: 1
PAINLEVEL_OUTOF10: 2
PAINLEVEL_OUTOF10: 5
PAINLEVEL_OUTOF10: 4

## 2022-09-20 NOTE — LACTATION NOTE
This note was copied from a baby's chart. Lactation Progress Note      Data:     RN requests f/u support with latching, AC glucose 56. Infant already at the breast, asleep without cues. Action: Offered support to mother and mother agrees. Encouraged breast support, showing mother how to hold infant with C shape hold behind the areola and how to hand express colostrum for infant. Several large drops expressed easily and fed to baby. Infant then began rooting with wide open mouth, LEONARDO achieved with SRS. After few minutes of feeding, infant became sleepy at the breast. Shown mother how to provide gentle stimulation to infant as needed to keep baby awake for feeding. Infant continues nursing well with gentle stimulation provided only briefly. Mother confirms the latch is comfortable. Reassured of LEONARDO observed with this feeding. Educated mother on various breast feeding positions, and tips for obtaining LEONARDO with feedings. Encouraged neck support rather than head support to allow infant to tip head back slightly for wide open gape when rooting just before latch on. Explained how holding infant's head causes chin to tuck onto the baby's chest and away from the breast. Reviewed how the infant should latch deeply and asymmetrically onto the areola, explaining benefits of nose to nipple and belly to belly positioning so that baby is at the breast with good spine alignment. Mother became nauseous and began vomiting. LC and FOB offering maternal support as needed. Once mother feeling better, LC noted that infant slipped further back away from the breast as mothers hand relaxed while vomiting. Shown mother signs of shallow latch, and instructed how to break the suction of the latch using finger in corner of infants mouth. Slight compression crease observed on nipple, explained risk for cracking with repeated shallow latches and importance to break the suction of the latch if ever shallow or causing discomfort.  Infant rooting with wide open mouth, LEONARDO achieved with SRS and AS. Mother confirms the latch is comfortable. Observed remainder of feeding and nipple rounded with release from the breast. Infant is sleepy and without feeding cues. Mother's IV access dislodged as mother repositioned infant to her chest and bleeding noted. LC held pressure over IV site with gauze, and taped when bleeding stopped. RN notified of loss of IV access, bleeding and pressure held. FOB swaddled infant and holding as RN at bedside for care. Name and number remain on whiteboard. Encouraged to call for f/u support prn. Response: Verbalized understanding of teaching. Pleased with comfortable latch and feeding. Will call for f/u support prn.

## 2022-09-20 NOTE — PLAN OF CARE
Problem: Pain  Goal: Verbalizes/displays adequate comfort level or baseline comfort level  2022 by Greg Olivier RN  Outcome: Progressing  2022 by Kodak Kuo RN  Outcome: Progressing  Flowsheets (Taken 2022 0007)  Verbalizes/displays adequate comfort level or baseline comfort level:   Encourage patient to monitor pain and request assistance   Assess pain using appropriate pain scale   Administer analgesics based on type and severity of pain and evaluate response     Problem: Postpartum  Goal: Experiences normal postpartum course  Description:  Postpartum OB-Pregnancy care plan goal which identifies if the mother is experiencing a normal postpartum course  2022 by Greg Olivier RN  Outcome: Progressing  2022 by Kodak Kuo RN  Outcome: Progressing  2022 by Ravindra Osman RN  Outcome: Progressing  Goal: Appropriate maternal -  bonding  Description:  Postpartum OB-Pregnancy care plan goal which identifies if the mother and  are bonding appropriately  2022 by Greg Olivier RN  Outcome: Progressing  2022 by Kodak Kuo RN  Outcome: Progressing  Goal: Establishment of infant feeding pattern  Description:  Postpartum OB-Pregnancy care plan goal which identifies if the mother is establishing a feeding pattern with their   2022 by Greg Olivier RN  Outcome: Progressing  2022 by Kodak Kuo RN  Outcome: Progressing  Goal: Incisions, wounds, or drain sites healing without S/S of infection  2022 by Greg Olivier RN  Outcome: Progressing  2022 by Kodak Kuo RN  Outcome: Progressing     Problem: Infection - Adult  Goal: Absence of infection at discharge  Outcome: Progressing  Goal: Absence of infection during hospitalization  Outcome: Progressing  Goal: Absence of fever/infection during anticipated neutropenic period  Outcome: Progressing     Problem: Safety - Adult  Goal: Free from fall injury  9/20/2022 0501 by Narinder Williamson RN  Outcome: Progressing  9/20/2022 0039 by Anita Delgado RN  Outcome: Progressing  Flowsheets (Taken 9/20/2022 0007)  Free From Fall Injury: Instruct family/caregiver on patient safety  9/19/2022 1631 by Gilma Glez RN  Flowsheets (Taken 9/19/2022 1631)  Free From Fall Injury: Instruct family/caregiver on patient safety     Problem: Discharge Planning  Goal: Discharge to home or other facility with appropriate resources  Outcome: Progressing     Problem: Chronic Conditions and Co-morbidities  Goal: Patient's chronic conditions and co-morbidity symptoms are monitored and maintained or improved  Outcome: Progressing

## 2022-09-20 NOTE — FLOWSHEET NOTE
Pt states no numbness and tingling remains from anesthesia and ready for first time get up after surgery. Pt assisted to side of bed and ambulated to bathroom with RN assist x2. Pt instructed on eunice care and kirk care provided per this RN. Kirk emptied for 400mls clear, yellow urine. Pt then ambulated back to bed after full linen change and comfort pad applied. Pt tolerated activity well with steady gait. Pt with no complaints at present time. Call light and phone at pt's side.

## 2022-09-20 NOTE — PLAN OF CARE
Problem: Pain  Goal: Verbalizes/displays adequate comfort level or baseline comfort level  2022 175 by Abdoul Mac RN  Outcome: Progressing  Flowsheets (Taken 2022 8824)  Verbalizes/displays adequate comfort level or baseline comfort level:   Encourage patient to monitor pain and request assistance   Assess pain using appropriate pain scale   Administer analgesics based on type and severity of pain and evaluate response   Implement non-pharmacological measures as appropriate and evaluate response  2022 by Narinder Williamson RN  Outcome: Progressing     Problem: Postpartum  Goal: Experiences normal postpartum course  Description:  Postpartum OB-Pregnancy care plan goal which identifies if the mother is experiencing a normal postpartum course  2022 175 by Abdoul Mac RN  Outcome: Progressing  2022 by Narinder Williamson RN  Outcome: Progressing  Goal: Appropriate maternal -  bonding  Description:  Postpartum OB-Pregnancy care plan goal which identifies if the mother and  are bonding appropriately  2022 by Abdoul Mac RN  Outcome: Progressing  2022 050 by Narinder Williamson RN  Outcome: Progressing  Goal: Establishment of infant feeding pattern  Description:  Postpartum OB-Pregnancy care plan goal which identifies if the mother is establishing a feeding pattern with their   2022 by Abdoul Mac RN  Outcome: Progressing  2022 by Narinder Williamson RN  Outcome: Progressing  Goal: Incisions, wounds, or drain sites healing without S/S of infection  2022 by Abdoul Mac RN  Outcome: Progressing  2022 by Narinder Williamson RN  Outcome: Progressing     Problem: Infection - Adult  Goal: Absence of infection at discharge  2022 by Abdoul Mac RN  Outcome: Progressing  2022 050 by Narinder Williamson RN  Outcome: Progressing  Goal: Absence of infection during hospitalization  2022 by Colleen Alejo Brandin Delgadillo RN  Outcome: Progressing  9/20/2022 0501 by Romeo Taylor RN  Outcome: Progressing  Goal: Absence of fever/infection during anticipated neutropenic period  9/20/2022 1758 by Lady Cano RN  Outcome: Progressing  9/20/2022 0501 by Romeo Taylor RN  Outcome: Progressing     Problem: Safety - Adult  Goal: Free from fall injury  9/20/2022 1758 by Lady Cano RN  Outcome: Progressing  Flowsheets (Taken 9/20/2022 9780)  Free From Fall Injury:   Instruct family/caregiver on patient safety   Based on caregiver fall risk screen, instruct family/caregiver to ask for assistance with transferring infant if caregiver noted to have fall risk factors  9/20/2022 0501 by Romeo Taylor RN  Outcome: Progressing     Problem: Discharge Planning  Goal: Discharge to home or other facility with appropriate resources  9/20/2022 1758 by Lady Cano RN  Outcome: Progressing  9/20/2022 0501 by Romeo Taylor RN  Outcome: Progressing     Problem: Chronic Conditions and Co-morbidities  Goal: Patient's chronic conditions and co-morbidity symptoms are monitored and maintained or improved  9/20/2022 1758 by Lady Cano RN  Outcome: Progressing  9/20/2022 0501 by Romeo Taylor RN  Outcome: Progressing

## 2022-09-20 NOTE — PLAN OF CARE
Problem: Pain  Goal: Verbalizes/displays adequate comfort level or baseline comfort level  Outcome: Progressing  Flowsheets (Taken 2022 000)  Verbalizes/displays adequate comfort level or baseline comfort level:   Encourage patient to monitor pain and request assistance   Assess pain using appropriate pain scale   Administer analgesics based on type and severity of pain and evaluate response     Problem: Vaginal Birth or  Section  Goal: Fetal and maternal status remain reassuring during the birth process  Description:  Birth OB-Pregnancy care plan goal which identifies if the fetal and maternal status remain reassuring during the birth process  Outcome: Completed     Problem: Postpartum  Goal: Experiences normal postpartum course  Description:  Postpartum OB-Pregnancy care plan goal which identifies if the mother is experiencing a normal postpartum course  2022 by Yesenia Goldsmith RN  Outcome: Progressing  2022 by Van Menendez RN  Outcome: Progressing  Goal: Appropriate maternal -  bonding  Description:  Postpartum OB-Pregnancy care plan goal which identifies if the mother and  are bonding appropriately  Outcome: Progressing  Goal: Establishment of infant feeding pattern  Description:  Postpartum OB-Pregnancy care plan goal which identifies if the mother is establishing a feeding pattern with their   Outcome: Progressing  Goal: Incisions, wounds, or drain sites healing without S/S of infection  Outcome: Progressing     Problem: Safety - Adult  Goal: Free from fall injury  2022 by Yesenia Goldsmith RN  Outcome: Progressing  Flowsheets (Taken 2022)  Free From Fall Injury: Instruct family/caregiver on patient safety  2022 163 by Johan Calvillo RN  Flowsheets (Taken 2022 1631)  Free From Fall Injury: Instruct family/caregiver on patient safety

## 2022-09-20 NOTE — LACTATION NOTE
This note was copied from a baby's chart. Lactation Progress Note      Data:   F/U with primip 1PO with breastfeeding and lactation rounds. Infant is 37w 2 days gestation, LGA. MOB with GHTN. MOB reports that infant is overall latching to both breast well. With more sleepy feedings, feels infant latch could be deeper. Nipples are intact with minimal soreness noted. Feeding Method: Feeding Method Used: Breastfeeding 77/72 min. LC involved. Glucoses followed for LGA, stable. 52/56/47  Urine output:  x2 established   Stool output:  x3 established  Percent weight change from birth:  -3%       Action: Introduced self to patient as Lactation RN, name and phone number written on white board in room. Tips given to help wake sleepy infant at breast to achieve deeper latch. Encouraged breast support, and latch check as needed. Reviewed with mother what to expect over the next  24-48 hours with infant feedings, infant output, and breast care. Reviewed infant feeding cues and encouraged mother to allow infant to breast feed on demand, a minimum of 8-12 times a day after the first day of life. Also encouraged mother to avoid giving infant a pacifier or bottle for at least the first two weeks of life. Binder and breast feeding log reviewed, all questions answered. Mother instructed to call Lactation nurse for F/U care as needed. Response: MOB verbalizes understanding of bf education that was provided. Feeling confident at time of consult with bf overall. Will call for f/u care as needed during her stay.

## 2022-09-20 NOTE — ANESTHESIA POSTPROCEDURE EVALUATION
Department of Anesthesiology  Postprocedure Note    Patient: Aurelia Mace  MRN: [id number]  YOB: 1995  Date of evaluation: 2022      Procedure Summary     Date: 22 Room / Location: [facility] L&D OR [address] [place] [address]    Anesthesia Start: 1620 Anesthesia Stop:     Procedure:  SECTION (Abdomen) Diagnosis:        delivery indicated due to breech presentation      ( [birth year])      (MOISE 10/8/22)      (EGA 39.4)      ()      (Primary)      (Synphoic in presentation? ? )      ([phone])    Surgeons: Lenin Ferreira MD Responsible Provider: Elizabeth Millan MD    Anesthesia Type: spinal ASA Status: 3 - Emergent          Anesthesia Type: No value filed.     Melanie Phase I: Melanie Score: 9    Melanie Phase II: Melanie Score: 10      Anesthesia Post Evaluation    Patient location during evaluation: bedside  Patient participation: complete - patient participated  Level of consciousness: awake and alert  Airway patency: patent  Nausea & Vomiting: no nausea and no vomiting  Complications: no  Cardiovascular status: hemodynamically stable  Respiratory status: acceptable  Hydration status: stable

## 2022-09-20 NOTE — PROGRESS NOTES
S:Ambulating, tolerating regular diet, decreased lochia, passing flatus, urinating without complaints, pain controlled with oral meds. O:  Vitals:    22 0427 22 0837 22 1241 22 1722   BP:  118/60 124/75 129/78   Pulse:  80 89 98   Resp: 16 16 16 16   Temp: 98 °F (36.7 °C) 98.2 °F (36.8 °C) 98.3 °F (36.8 °C) 98.2 °F (36.8 °C)   TempSrc: Axillary Oral Oral Oral   SpO2: 100% 100% 100%    Weight:       Height:          Abd:BS present, NT,ND  Inc:C/D/I  Fundus:Firm 2-3 cm below umbilicus  Recent Labs     22  0629   WBC 11.7*   RBC 3.32*   HGB 8.9*   HCT 27.2*   MCV 81.8   RDW 16.4*          A/P: 33 y/o  PPD1 from PLT, [facility] w/o proteinuria  1)Progressing - anticipate d/c in 1-2 days  2)Anemia - ferrous sulfate bid  3)Cont. Current management.

## 2022-09-20 NOTE — ADT AUTH CERT
Adamaris Ambrose #[id number] ([place]:[id number]) (:1995 32 y.o.  F) (Adm: 22)  [facility] [id number]  Delivery Summary    Adamaris Ambrose [[id number]]   (22 to present)  Birth Date: [birth year] Age (as of 22): 26 Ethnicity: Non- / Non  Race: White (non-)   History:  Estimated Date of Delivery: 10/08/22 Gestational Age: 42w2d Blood Type: A POS     OB History       1    Para   1    Term   1            AB        Living   1      SAB        IAB        Ectopic        Molar        Multiple   0    Live Births   1         # Outcome Date GA Labor/2nd Weight Sex Delivery Anes PTL Lv A1 A5   1 Term 22 37w2d  7 lb 15.7 oz (3.62 kg) F CS-LTranv Spinal N Living 8 9   Name: [facility]   Location: Other   Delivering Clinician: Mauricio Benites MD        Dating Summary    Working MOISE: 10/08/22 set by Zuly Reeves RN on 22 based on Other Basis     Based On MOISE GA Diff GA User Date   Other Basis 10/08/22 Working  Zuly Reeves RN 22   Alternate MOISE Entry  Comment: Date entered prior to episode creation 10/06/22 +2d  Zuly Reeves RN 22     Prenatal Results      1st Trimester    Test Value Reference Range Date Time   ABO/Rh  A POS   22 1535   Antibody       HCT  34.8 % Important  36.0 - 48.0 22   HGB  11.7 g/dL Important  12.0 - 16.0 22   Rubella       RPR       Urine Prot       HBsAg       HIV       Gonorrhea       Chlamydia       TSH       TB       Pap         2nd Trimester    Test Value Reference Range Date Time   HCT       HGB       Glucose  91 mg/dL 70 - 99 22 1125      92 mg/dL 70 - 99 22     3rd Trimester    Test Value Reference Range Date Time   HCT  27.2 % Important  36.0 - 48.0 22 0629      34.2 % Important  36.0 - 48.0 22 1125   HGB  8.9 g/dL Important  12.0 - 16.0 22 0629      11.3 g/dL Important  12.0 - 16.0 22 1125   GBS          Genetic Screening    Test Value Reference Range Date vacuum: 1  Vacuum applied by: DR Amber Wilson  Failed vacuum delivery?: No      Shoulder Dystocia    Shoulder dystocia present?: No       Measurements    Weight: 3620 g Length: 51.4 cm   Head circumference: 37 cm        Lacerations    Episiotomy: None  Perineal laceration: None  Other lacerations?: No      Placenta    Placenta delivery date/time: 2022  Placenta removal: Spontaneous  Placenta appearance: Intact  Placenta disposition: Placenta Refrigerator      Vaginal Counts      Sponges Needles Instruments   Initial counts      Final counts        Roger [employer] [[id number]]   Information    Head delivery date/time: 2022 17:20:00   Changing the 's delivery date/time could affect patient care.:    Delivery date/time:  22   Delivery type: , Low Transverse   Details:  Trial of labor?: No    categorization: Primary    priority: Non-scheduled   Indications for : Macrosomia   Skin Incision Type: Low Transverse   Uterine Incision: Low Transverse        Delivery Providers    Delivering clinician: Lovely Ardon MD  Provider Role   Lovely Ardon MD Obstetrician    Primary Nurse    Primary  Nurse    NICU Nurse    Neonatologist    Anesthesiologist    Nurse Anesthetist    Nurse Practitioner    Midwife    Nursery Nurse     Apgars    Living status: Living   Apgars assigned by: DR Fredo Vu     Apgars  Component 1 min. 5 min.    Skin color:  0  1    Heart rate:  2  2    Reflex irritability:  2  2    Muscle tone:  2  2    Respiratory effort:  2  2    Total:  8  9      Cord    Vessels: 3 Vessels  Complications: None  Delayed cord clamping?: No  Cord clamped date/time: 2022  Cord blood disposition: Refrigerator  Gases sent?: No   Measurements    Weight: 3620 g Length: 51.4 cm   Head circumference: 37 cm      Placenta    Placenta delivery date/time: 2022  Placenta removal: Spontaneous  Placenta appearance: Intact  Placenta disposition: Placenta Refrigerator  Lacerations    Episiotomy: None  Perineal laceration: None  Other lacerations?: No

## 2022-09-20 NOTE — FLOWSHEET NOTE
Lactation Progress Note      Data:    MOB states infant is feeding well every other feed. Is worried about blood sugar. MOB states infant had 1 low blood sugar. MOB denies any soreness. Action: I explained normal feeding patterns and sleeping patterns. Encouraged to keep watching for the baby's feeding cues and attempt to feed every 2-3 hrs. Name and number on board. Encouraged to call with questions or a latch check. Response: MOB and FOB nodded and verbalized understanding. No further questions at this time.

## 2022-09-20 NOTE — PLAN OF CARE
Problem: Postpartum  Goal: Experiences normal postpartum course  Description:  Postpartum OB-Pregnancy care plan goal which identifies if the mother is experiencing a normal postpartum course  Outcome: Progressing

## 2022-09-21 VITALS
OXYGEN SATURATION: 98 % | RESPIRATION RATE: 16 BRPM | BODY MASS INDEX: 44.2 KG/M2 | WEIGHT: 275 LBS | TEMPERATURE: 98.2 F | SYSTOLIC BLOOD PRESSURE: 135 MMHG | HEART RATE: 94 BPM | HEIGHT: 66 IN | DIASTOLIC BLOOD PRESSURE: 79 MMHG

## 2022-09-21 PROCEDURE — 6370000000 HC RX 637 (ALT 250 FOR IP): Performed by: OBSTETRICS & GYNECOLOGY

## 2022-09-21 PROCEDURE — 90707 MMR VACCINE SC: CPT | Performed by: OBSTETRICS & GYNECOLOGY

## 2022-09-21 PROCEDURE — 90471 IMMUNIZATION ADMIN: CPT | Performed by: OBSTETRICS & GYNECOLOGY

## 2022-09-21 PROCEDURE — 6360000002 HC RX W HCPCS: Performed by: OBSTETRICS & GYNECOLOGY

## 2022-09-21 RX ORDER — IBUPROFEN 800 MG/1
800 TABLET ORAL EVERY 8 HOURS PRN
Qty: 30 TABLET | Refills: 0 | Status: SHIPPED | OUTPATIENT
Start: 2022-09-21 | End: 2022-11-01 | Stop reason: CLARIF

## 2022-09-21 RX ORDER — CEPHALEXIN 500 MG/1
500 CAPSULE ORAL EVERY 8 HOURS SCHEDULED
Qty: 9 CAPSULE | Refills: 0 | Status: SHIPPED | OUTPATIENT
Start: 2022-09-21 | End: 2022-09-22

## 2022-09-21 RX ORDER — OXYCODONE HYDROCHLORIDE 5 MG/1
5 TABLET ORAL EVERY 4 HOURS PRN
Qty: 14 TABLET | Refills: 0 | Status: SHIPPED | OUTPATIENT
Start: 2022-09-21 | End: 2022-09-24

## 2022-09-21 RX ORDER — FERROUS SULFATE 325(65) MG
325 TABLET ORAL 2 TIMES DAILY WITH MEALS
Qty: 60 TABLET | Refills: 0 | Status: SHIPPED | OUTPATIENT
Start: 2022-09-21

## 2022-09-21 RX ORDER — METRONIDAZOLE 500 MG/1
500 TABLET ORAL EVERY 8 HOURS SCHEDULED
Qty: 9 TABLET | Refills: 0 | Status: SHIPPED | OUTPATIENT
Start: 2022-09-21 | End: 2022-09-24

## 2022-09-21 RX ADMIN — IBUPROFEN 800 MG: 800 TABLET, FILM COATED ORAL at 15:12

## 2022-09-21 RX ADMIN — FAMOTIDINE 20 MG: 20 TABLET, FILM COATED ORAL at 09:41

## 2022-09-21 RX ADMIN — CEPHALEXIN 500 MG: 250 CAPSULE ORAL at 05:43

## 2022-09-21 RX ADMIN — MEASLES, MUMPS, AND RUBELLA VIRUS VACCINE LIVE 0.5 ML: 1000; 12500; 1000 INJECTION, POWDER, LYOPHILIZED, FOR SUSPENSION SUBCUTANEOUS at 11:13

## 2022-09-21 RX ADMIN — ACETAMINOPHEN 1000 MG: 500 TABLET ORAL at 01:43

## 2022-09-21 RX ADMIN — CEPHALEXIN 500 MG: 250 CAPSULE ORAL at 15:11

## 2022-09-21 RX ADMIN — FERROUS SULFATE TAB 325 MG (65 MG ELEMENTAL FE) 325 MG: 325 (65 FE) TAB at 09:41

## 2022-09-21 RX ADMIN — METRONIDAZOLE 500 MG: 250 TABLET ORAL at 15:13

## 2022-09-21 RX ADMIN — IBUPROFEN 800 MG: 800 TABLET, FILM COATED ORAL at 05:43

## 2022-09-21 RX ADMIN — DOCUSATE SODIUM 100 MG: 100 CAPSULE, LIQUID FILLED ORAL at 09:41

## 2022-09-21 RX ADMIN — METRONIDAZOLE 500 MG: 250 TABLET ORAL at 05:43

## 2022-09-21 RX ADMIN — ACETAMINOPHEN 1000 MG: 500 TABLET ORAL at 11:11

## 2022-09-21 ASSESSMENT — PAIN DESCRIPTION - DESCRIPTORS
DESCRIPTORS: BURNING
DESCRIPTORS: BURNING

## 2022-09-21 ASSESSMENT — PAIN DESCRIPTION - LOCATION
LOCATION: ABDOMEN
LOCATION: ABDOMEN

## 2022-09-21 ASSESSMENT — PAIN SCALES - GENERAL
PAINLEVEL_OUTOF10: 2
PAINLEVEL_OUTOF10: 3
PAINLEVEL_OUTOF10: 1

## 2022-09-21 ASSESSMENT — PAIN DESCRIPTION - ORIENTATION: ORIENTATION: LOWER

## 2022-09-21 NOTE — LACTATION NOTE
This note was copied from a baby's chart. Lactation Progress Note      Data:   F/U with primip 2PO with breastfeeding and lactation rounds on day of discharge home. MOB reports that infant cluster fed overnight. Feels infant is latching well to both breast and having good output. Denies concerns at this time. Would like LC to go over her pump that she ordered for home use. Feeding Method: Feeding Method Used: Breastfeeding 85/130 min. LC involved. Urine output:  x4 established   Stool output:  x2 established  Percent weight change from birth:  -7%    Action: Introduced self to patient as LC for the day. Name and number placed on whiteboard. BF education reviewed with patient and what to expect over then next 1-2 weeks with mature milk production, infant feedings, infant output, breast care, engorgement prevention, pacifier, bottle use, and pumping information. Discharge binder also reviewed with patient with f/u care and resources provided. Christine breast pump parts, set up, and fitting performed at bedside. All questions answered at this time. RN updated with education that was provided to patient. Patient instructed to call for f/u care as needed. Response: MOB verbalizes understanding of bf education that was provided. Feels comfortable with breastfeeding at this time. Will call for f/u care with outpatient services as needed.

## 2022-09-21 NOTE — PLAN OF CARE
Problem: Pain  Goal: Verbalizes/displays adequate comfort level or baseline comfort level  2022 0743 by Umberto Sheriff RN  Outcome: Progressing  2022 0609 by aMximiliano Buchanan RN  Outcome: Progressing  Flowsheets (Taken 2022 0143)  Verbalizes/displays adequate comfort level or baseline comfort level:   Encourage patient to monitor pain and request assistance   Assess pain using appropriate pain scale   Administer analgesics based on type and severity of pain and evaluate response   Implement non-pharmacological measures as appropriate and evaluate response   Consider cultural and social influences on pain and pain management  2022 175 by Debi Godinez RN  Outcome: Progressing  Flowsheets (Taken 2022 8452)  Verbalizes/displays adequate comfort level or baseline comfort level:   Encourage patient to monitor pain and request assistance   Assess pain using appropriate pain scale   Administer analgesics based on type and severity of pain and evaluate response   Implement non-pharmacological measures as appropriate and evaluate response     Problem: Postpartum  Goal: Experiences normal postpartum course  Description:  Postpartum OB-Pregnancy care plan goal which identifies if the mother is experiencing a normal postpartum course  2022 0743 by Umberto Sheriff RN  Outcome: Progressing  2022 06 by Maximiliano Buchanan RN  Outcome: Progressing  2022 by Debi Godinez RN  Outcome: Progressing  Goal: Appropriate maternal -  bonding  Description:  Postpartum OB-Pregnancy care plan goal which identifies if the mother and  are bonding appropriately  2022 0743 by Umberto Sheriff RN  Outcome: Progressing  2022 by Maximiliano Buchanan RN  Outcome: Progressing  2022 by Debi Godinez RN  Outcome: Progressing  Goal: Establishment of infant feeding pattern  Description:  Postpartum OB-Pregnancy care plan goal which identifies if the mother is establishing a feeding pattern with their   2022 0743 by Amada Carpio RN  Outcome: Progressing  2022 06 by May Grimaldo RN  Outcome: Progressing  2022 by Sabine Vance RN  Outcome: Progressing  Goal: Incisions, wounds, or drain sites healing without S/S of infection  2022 0743 by Amada Carpio RN  Outcome: Progressing  2022 0609 by May Grimaldo RN  Outcome: Progressing  2022 by Sabine Vance RN  Outcome: Progressing     Problem: Safety - Adult  Goal: Free from fall injury  2022 0743 by Amada Carpio RN  Outcome: Progressing  2022 by May Grimaldo RN  Outcome: Progressing  2022 by Sabine Vance RN  Outcome: Progressing  Flowsheets (Taken 2022 6168)  Free From Fall Injury:   Instruct family/caregiver on patient safety   Based on caregiver fall risk screen, instruct family/caregiver to ask for assistance with transferring infant if caregiver noted to have fall risk factors     Problem: Discharge Planning  Goal: Discharge to home or other facility with appropriate resources  2022 0743 by Amada Carpio RN  Outcome: Progressing  2022 by May Grimaldo RN  Outcome: Progressing  2022 by Sabine Vance RN  Outcome: Progressing

## 2022-09-21 NOTE — PROGRESS NOTES
MMR vaccine information provided to patient. Consents signed and placed in patient chart. Vaccine given to patient by this RN.

## 2022-09-21 NOTE — DISCHARGE SUMMARY
Obstetric Discharge Summary    Admitting Diagnosis  IUP 37.2 weeks  OB History          1    Para   1    Term   1            AB        Living   1         SAB        IAB        Ectopic        Molar        Multiple   0    Live Births   1                Reasons for Admission on 2022 11:02 AM   delivery indicated due to breech presentation [O32.1XX0]  Gestational hypertension w/o significant proteinuria in 3rd trimester [O13.3]   Section (Primary)    Prenatal Procedures  None    Intrapartum Procedures   Delivery: See Labor and Delivery Summary     Postpartum Procedures  None    Postpartum/Operative Complications  None apparant     Data  Information for the patient's :  Dimitry Class Girl [place]   female   Birth Weight: 7 lb 15.7 oz (3.62 kg)   Discharge With Mother  Complications: No    Discharge Diagnosis  Post operative day 2  S/p elective repeat  section    Discharge Information  Current Discharge Medication List        CONTINUE these medications which have NOT CHANGED    Details   aspirin 81 MG chewable tablet Take 81 mg by mouth daily      Prenatal Vit-Fe Fumarate-FA (PRENATAL VITAMIN PO) Take 1 tablet by mouth daily           STOP taking these medications       naproxen (NAPROSYN) 500 MG tablet Comments:   Reason for Stopping:               OBGYN Attending Progress Note  POD#2 s/p RLTCD    S: No complaints, tolerating po intake, pain controlled by meds, + ambulation, +flatus, lochia decreasing less than menses, voiding without difficulty. O: BP (!) 113/55   Pulse 90   Temp 99.2 °F (37.3 °C) (Oral)   Resp 16   Ht 5' 6\" (1.676 m)   Wt 275 lb (124.7 kg)   SpO2 97%   Breastfeeding Unknown   BMI 44.39 kg/m²   Abd - soft, fundus firm below umbilicus, incision c/d/i w dermabond, healing well  Ext warm well perfused    WBC/Hgb/Hct/Plts:  11.7/8.9/27.2/176 (629)    A/P: POD #2 s/p RLCTD    1. Recovering well  2.  Meeting postpartum milestones. 3. Pelvic rest x 6wk  4. Rx for Ibuprofen and Roxicodone provided prn moderate to severe pain. Alternate with otc tylenol. Continue home rx for po iron for anemia. Rx for keflex and flagyl for postpartum antibiotics to reduce post op wound infection risk. 5. Desires early discharge home today. Call office if concern for fever >100.4F, poorly controlled pain, bleeding soaking at least 2 pads/hr x2 consecutive hours. Discharge to: Home  Follow up in 1 weeks at [address] [place] [address] for bp and incision check and then in 4 weeks for postpartum visit.   Condition stable    DO ANGELLA Champion

## 2022-09-21 NOTE — DISCHARGE INSTRUCTIONS
Thank you for the opportunity to care for you and your family. We hope that you are happy with the care we provided during your stay in the [facility]. We want to ensure that you have the help that you need when you leave the hospital. If there is anything that we can assist you with please let us know. Breastfeeding mothers may contact our lactation specialists with any problems or questions. [facility] lactation services phone number is [phone]. Leave a message and your call will be returned. Please refer to the information provided in the \"Caring for Yourself\" tab in your discharge binder (Guidelines for [employer]). The following are warning signs to remember. Call 911 if you have:    Chest pain or pressure  Shortness of breath, even at rest  Thoughts of hurting yourself or your baby  Seizures    Call your healthcare provider if you have:    Temperature of 100.4 degrees or higher  Stitches that are not healing             --Swelling, bleeding, drainage, foul odor, redness or warmth in/around your                 stitches, staples, or incision (scar)               -- Bad smelling blood or discharge from the vagina  Vaginal bleeding that has increased             --Soaking through one pad in an hour             --You are passing clots larger than the size of a lemon. Red, warm tender area(s) in your breast or calf. Headache that does not get better, even after taking medicine; or headache with vision changes    Remember to notify all healthcare providers from your date of delivery up to one year after birth! CARING FOR YOURSELF      DIET/ACTIVITY    Eat a well balanced diet focusing on food high in fiber and protein. Drink plenty of fluids, especially water. To avoid constipation you may take a mild stool softener as recommended by your doctor or midwife. Gradually increase your activity.  Resume an exercise regime only after being advised by your doctor or midwife. When sitting or lying down, keep your legs elevated to reduce swelling. Avoid lifting anything heavier than your baby or a gallon of milk. Avoid driving for two weeks or while taking narcotics. No sexual intercourse for 6 weeks, or until advised by your OB provider. Nothing in the vagina: intercourse, tampons or douching. Be prepared to discuss family planning at your follow up OB visit. If you feel tired and have a lack of energy, you may continue to take your prenatal vitamins. Nap when your baby naps to catch up on sleep. EMOTIONS    You may feel peng, sad, teary and overwhelmed. Contact your OB provider if you think you may be showing signs of postpartum depression. Please refer to the \"Going Home\" tab in your discharge binder. If your baby will not stop crying, contact another adult to help or place the baby in its crib on its back and take a break. Never shake your baby! INCISIONAL/DOTTIE CARE    Clean your incision in the shower with mild soap. After shower pat the incision area dry and allow it to be open to the air. If used, steri strips should be removed by 2 weeks. If you are discharged with staples in place, call your OB provider's office to schedule removal.  Vaginal bleeding will decrease in amount over the next few weeks. Cleanse your perineum from front to back using the dottie bottle, after toileting, until bleeding stops. You will notice that as your activity increases, your flow may also increase. This is a sign that you need to rest more often. Call your OB provider if you are saturating more than one maxi pad in an hour and rest does not help. BREAST CARE    FOR BREASTFEEDING MOMS:    If you become engorged, feeding may be more difficult or painful for 1 to 2 days. You may find it helpful to hand express some milk so that the infant can latch more easily. While breastfeeding continue to take your prenatal vitamins as directed.   Refer to the breastfeeding information

## 2022-09-21 NOTE — PROGRESS NOTES
Discharge Phone Call    Patient Name: Morena Red     [facility] Care Provider: Gallito Posada MD Discharge Date: 2022    Disposition of baby:    Phone Number: [phone] (home)     Attempts to Contact:  Date:    Caller  Date:    Caller  Date:    Caller    Information for the patient's :  Christopher Short Girl Lul Simeon [[id number]]   Delivery Method: , Low Transverse     1. Now that you are at home is your pain being well controlled? Y/N   If no, instruct to call       provider. 2. Are you breastfeeding? Y/N    Do you need any extra support from our lactation staff? Y/N    If yes, provide number for lactation. 3. Have you made or already had your first appointment with the baby's doctor? Y/N   If no, do      you know when to schedule it? Y/N    4. Have you scheduled your follow-up appointment? Y/N  If no, do you know when to schedule       it? Y/N   If no, they can find it on printed discharge instructions. 5. Did staff discuss safe sleep during your stay? Y/N   6. Did we explain things in a way you could understand? Y/N  7. Were we respectful of your preferences for labor and birth and include you in the plan of       care? Y/N  If no, please explain _______________________________________________  8. Is there anyone in particular you would like to mention who provided care for you? _______      _________________________________________________________________________     9. Were you given a Post-Birth Warning Signs handout? Y/N  Do you have it somewhere      easily accessible? Y/N  If no, please send them a copy and ask them to put it somewhere      easily found. 10. Have you been crying excessively, having anger or mood swings that feel out of control, or       feel like you can't cope with caring for yourself or baby? Y/N   If yes, they may be showing       signs of postpartum depression and should call provider.  There is also a        depression test on page C5 in their discharge booklet they can take. 13. Do you have any other questions or concerns I can address today?  Y/N  ______________      _________________________________________________________________________    Information provided during call :_________________________________________________  ___________________________________________________________________________    Call completed by:____________________________    Date:_________ Time:___________

## 2022-09-21 NOTE — PROGRESS NOTES
Discharge instructions provided to patient. All questions answered at bedside. Patient is to be seen for follow-up in office in one week.

## 2022-09-21 NOTE — PLAN OF CARE
Problem: Pain  Goal: Verbalizes/displays adequate comfort level or baseline comfort level  2022 06 by Greg Olivier RN  Outcome: Progressing  Flowsheets (Taken 2022 0143)  Verbalizes/displays adequate comfort level or baseline comfort level:   Encourage patient to monitor pain and request assistance   Assess pain using appropriate pain scale   Administer analgesics based on type and severity of pain and evaluate response   Implement non-pharmacological measures as appropriate and evaluate response   Consider cultural and social influences on pain and pain management  2022 by Imtiaz Barker RN  Outcome: Progressing  Flowsheets (Taken 2022 7035)  Verbalizes/displays adequate comfort level or baseline comfort level:   Encourage patient to monitor pain and request assistance   Assess pain using appropriate pain scale   Administer analgesics based on type and severity of pain and evaluate response   Implement non-pharmacological measures as appropriate and evaluate response     Problem: Postpartum  Goal: Experiences normal postpartum course  Description:  Postpartum OB-Pregnancy care plan goal which identifies if the mother is experiencing a normal postpartum course  2022 by Greg Olivier RN  Outcome: Progressing  2022 by Imtiaz Barker RN  Outcome: Progressing  Goal: Appropriate maternal -  bonding  Description:  Postpartum OB-Pregnancy care plan goal which identifies if the mother and  are bonding appropriately  2022 by Greg Olivier RN  Outcome: Progressing  2022 by Imtiaz Barker RN  Outcome: Progressing  Goal: Establishment of infant feeding pattern  Description:  Postpartum OB-Pregnancy care plan goal which identifies if the mother is establishing a feeding pattern with their   2022 by Greg Olivier RN  Outcome: Progressing  2022 by Imtiaz Barker RN  Outcome: Progressing  Goal: Incisions, wounds, or drain sites healing without S/S of infection  9/21/2022 0609 by Ever Patiño RN  Outcome: Progressing  9/20/2022 1758 by Juan Francisco aHnsen RN  Outcome: Progressing     Problem: Infection - Adult  Goal: Absence of infection at discharge  9/21/2022 0609 by Ever Patiño RN  Outcome: Progressing  9/20/2022 1758 by Juan Francisco Hansen RN  Outcome: Progressing  Goal: Absence of infection during hospitalization  9/21/2022 0609 by Ever Patiño RN  Outcome: Progressing  9/20/2022 1758 by Juan Francisco Hansen RN  Outcome: Progressing  Goal: Absence of fever/infection during anticipated neutropenic period  9/21/2022 0609 by Ever Patiño RN  Outcome: Progressing  9/20/2022 1758 by Juan Francisco Hansen RN  Outcome: Progressing     Problem: Safety - Adult  Goal: Free from fall injury  9/21/2022 0609 by Ever Patiño RN  Outcome: Progressing  9/20/2022 1758 by Juan Francisco Hansen RN  Outcome: Progressing  Flowsheets (Taken 9/20/2022 8745)  Free From Fall Injury:   Instruct family/caregiver on patient safety   Based on caregiver fall risk screen, instruct family/caregiver to ask for assistance with transferring infant if caregiver noted to have fall risk factors     Problem: Discharge Planning  Goal: Discharge to home or other facility with appropriate resources  9/21/2022 0609 by Ever Patiño RN  Outcome: Progressing  9/20/2022 1758 by Juan Francisco Hansen RN  Outcome: Progressing     Problem: Chronic Conditions and Co-morbidities  Goal: Patient's chronic conditions and co-morbidity symptoms are monitored and maintained or improved  9/21/2022 0609 by Ever Patiño RN  Outcome: Progressing  9/20/2022 1758 by Juan Francisco Hansen RN  Outcome: Progressing

## 2022-11-01 ENCOUNTER — OFFICE VISIT (OUTPATIENT)
Dept: FAMILY MEDICINE CLINIC | Age: 27
End: 2022-11-01

## 2022-11-01 VITALS
HEART RATE: 70 BPM | DIASTOLIC BLOOD PRESSURE: 80 MMHG | BODY MASS INDEX: 41.64 KG/M2 | TEMPERATURE: 97.9 F | WEIGHT: 258 LBS | SYSTOLIC BLOOD PRESSURE: 118 MMHG | OXYGEN SATURATION: 98 %

## 2022-11-01 DIAGNOSIS — Z00.00 PHYSICAL EXAM: Primary | ICD-10-CM

## 2022-11-01 DIAGNOSIS — Z11.1 TUBERCULOSIS SCREENING: ICD-10-CM

## 2022-11-01 DIAGNOSIS — Z11.59 ENCOUNTER FOR SCREENING FOR OTHER VIRAL DISEASES: ICD-10-CM

## 2022-11-01 DIAGNOSIS — E66.01 CLASS 3 SEVERE OBESITY DUE TO EXCESS CALORIES WITHOUT SERIOUS COMORBIDITY WITH BODY MASS INDEX (BMI) OF 40.0 TO 44.9 IN ADULT (HCC): ICD-10-CM

## 2022-11-01 LAB — HBV SURFACE AB TITR SER: <3.5 MIU/ML

## 2022-11-01 ASSESSMENT — ENCOUNTER SYMPTOMS
BLOOD IN STOOL: 0
CHEST TIGHTNESS: 0
COUGH: 0
SHORTNESS OF BREATH: 0
DIARRHEA: 0
CONSTIPATION: 0

## 2022-11-01 ASSESSMENT — PATIENT HEALTH QUESTIONNAIRE - PHQ9
SUM OF ALL RESPONSES TO PHQ QUESTIONS 1-9: 0
SUM OF ALL RESPONSES TO PHQ QUESTIONS 1-9: 0
2. FEELING DOWN, DEPRESSED OR HOPELESS: 0
1. LITTLE INTEREST OR PLEASURE IN DOING THINGS: 0
SUM OF ALL RESPONSES TO PHQ QUESTIONS 1-9: 0
SUM OF ALL RESPONSES TO PHQ QUESTIONS 1-9: 0
SUM OF ALL RESPONSES TO PHQ9 QUESTIONS 1 & 2: 0

## 2022-11-01 NOTE — PROGRESS NOTES
11/1/2022    Chief Complaint   Patient presents with    Forms     Has a form to be filled out     Annual Exam     Fasting except for coffee with cream        Ronaldo Kaur is a 32 y.o. female, presents today for:      ASSESSMENT/PLAN:    1. Physical exam  Encouraged self care  Encouraged 30-45 minutes daily physical exercise as tolearted  Encouraged portion control, mixture of protein, carbohydrates, fruits/ veggies. Encouraged Sleep Hygiene  Cleared for clinicals    2. Encounter for screening for other viral diseases  Screening labs ordered today  - Hepatitis B Surface Antibody  - Hepatitis B Core Antibody, Total  - Mumps, Measles, Rubella, and Varicella Zoster, IgG    3. 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 Has taken TB treatment in past.  Chip Tabares today for screening for school  - Quantiferon, Incubated    4. Class 3 severe obesity due to excess calories without serious comorbidity with body mass index (BMI) of 40.0 to 44.9 in adult [facility])  Encouraged diet/ exercise changes. Return in about 1 year (around 11/1/2023). Presenting today for annual visit for school. 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 Previously tested positive to 2 step TB, needing T-Spot testing. PHQ Scores 11/1/2022 7/5/2022   PHQ2 Score 0 0   PHQ9 Score 0 0     Interpretation of Total Score Depression Severity: 1-4 = Minimal depression, 5-9 = Mild depression, 10-14 = Moderate depression, 15-19 = Moderately severe depression, 20-27 = Severe depression    No flowsheet data found.   Interpretation of LEIGH ANN-7 score: 5-9 = mild anxiety, 10-14 = moderate anxiety, 15+ = severe anxiety. Recommend referral to behavioral health for scores 10 or greater. PHQ Scores 11/1/2022 7/5/2022   PHQ2 Score 0 0   PHQ9 Score 0 0     Interpretation of Total Score Depression Severity: 1-4 = Minimal depression, 5-9 = Mild depression, 10-14 = Moderate depression, 15-19 = Moderately severe depression, 20-27 = Severe depression      Lab Results   Component Value Date     (L) 09/19/2022    K 3.9 09/19/2022     09/19/2022    CO2 21 09/19/2022    BUN 7 09/19/2022    CREATININE <0.5 (L) 09/19/2022    GLUCOSE 91 09/19/2022    CALCIUM 8.7 09/19/2022    PROT 6.9 09/19/2022    LABALBU 3.6 09/19/2022    BILITOT 0.3 09/19/2022    ALKPHOS 119 09/19/2022    AST 15 09/19/2022    ALT 6 (L) 09/19/2022    LABGLOM >60 09/19/2022    GFRAA >60 09/19/2022    AGRATIO 1.1 09/19/2022         Review of Systems   Constitutional: Negative. Respiratory:  Negative for cough, chest tightness and shortness of breath. Cardiovascular: Negative. Gastrointestinal:  Negative for blood in stool, constipation and diarrhea. Genitourinary:  Negative for vaginal bleeding, vaginal discharge and vaginal pain. Skin: Negative. Neurological:  Negative for dizziness, tremors, light-headedness and headaches. Hematological: Negative. Psychiatric/Behavioral:  Negative for decreased concentration, dysphoric mood, self-injury, sleep disturbance and suicidal ideas. The patient is not nervous/anxious. Current Outpatient Medications on File Prior to Visit   Medication Sig Dispense Refill    ferrous sulfate (IRON 325) 325 (65 Fe) MG tablet Take 1 tablet by mouth 2 times daily (with meals) 60 tablet 0    Prenatal Vit-Fe Fumarate-FA (PRENATAL VITAMIN PO) Take 1 tablet by mouth daily      [DISCONTINUED] aspirin 81 MG chewable tablet Take 81 mg by mouth daily      [DISCONTINUED] naproxen (NAPROSYN) 500 MG tablet Take 1 tablet by mouth 2 times daily.  30 tablet 0     No current facility-administered medications on file prior to visit. Allergies   Allergen Reactions    Other Hives     shellfish     Past Medical History:   Diagnosis Date    Abnormal Pap smear of cervix     Anemia     TB (tuberculosis)     completed 6 months oral therapy     Past Surgical History:   Procedure Laterality Date    ADENOIDECTOMY       SECTION N/A 2022     SECTION performed by Reggie Pimentel MD at [facility] L[facility]D OR     Social History     Tobacco Use    Smoking status: Never    Smokeless tobacco: Never   Substance Use Topics    Alcohol use: No     History reviewed. No pertinent family history. Vitals:    22 0815   BP: 118/80   Pulse: 70   Temp: 97.9 °F (36.6 °C)   TempSrc: Tympanic   SpO2: 98%   Weight: 258 lb (117 kg)     Estimated body mass index is 41.64 kg/m² as calculated from the following:    Height as of 22: 5' 6\" (1.676 m). Weight as of this encounter: 258 lb (117 kg). Physical Exam  Vitals reviewed. Constitutional:       Appearance: Normal appearance. HENT:      Head: Normocephalic. Right Ear: Tympanic membrane, ear canal and external ear normal.      Left Ear: Tympanic membrane, ear canal and external ear normal.      Nose: Nose normal.      Mouth/Throat:      Mouth: Mucous membranes are moist.   Eyes:      Extraocular Movements: Extraocular movements intact. Conjunctiva/sclera: Conjunctivae normal.      Pupils: Pupils are equal, round, and reactive to light. Neck:      Vascular: No carotid bruit. Cardiovascular:      Rate and Rhythm: Normal rate and regular rhythm. Pulses: Normal pulses. Carotid pulses are 2+ on the right side and 2+ on the left side. Dorsalis pedis pulses are 2+ on the right side and 2+ on the left side. Posterior tibial pulses are 2+ on the right side and 2+ on the left side. Heart sounds: Normal heart sounds. No murmur heard. No gallop.    Pulmonary:      Effort: Pulmonary effort is normal.      Breath sounds: Normal breath sounds. Abdominal:      General: Abdomen is flat. Palpations: Abdomen is soft. Musculoskeletal:         General: Normal range of motion. Cervical back: Normal range of motion. Right lower leg: No edema. Left lower leg: No edema. Lymphadenopathy:      Cervical: No cervical adenopathy. Skin:     General: Skin is warm and dry. Capillary Refill: Capillary refill takes less than 2 seconds. Neurological:      General: No focal deficit present. Mental Status: She is alert and oriented to person, place, and time. Psychiatric:         Mood and Affect: Mood normal.         Behavior: Behavior normal.         Patient's questions answered and concerns addressed. Patient agrees to plan of care.         Electronically signed by RAJ Prabhakar CNP on 11/1/2022 at 9:46 AM

## 2022-11-02 DIAGNOSIS — Z11.1 TUBERCULOSIS SCREENING: Primary | ICD-10-CM

## 2022-11-02 LAB
MEASLES IMMUNE (IGG): NORMAL
MUMPS AB IGG: NORMAL
RUBELLA ANTIBODY IGG: NORMAL
VARICELLA-ZOSTER VIRUS AB, IGG: NORMAL

## 2022-11-03 LAB — HEPATITIS B CORE TOTAL ANTIBODY: NEGATIVE

## 2022-11-03 NOTE — RESULT ENCOUNTER NOTE
Immune to MMR but not to Hep B. Need to have 1 updated booster to Hepatitis B. Can come in to the office to receive this when you are ready.
The TB test was collected in the wrong tube. Do we need to call the lab and discuss this with them?
No

## 2022-11-07 ENCOUNTER — NURSE ONLY (OUTPATIENT)
Dept: FAMILY MEDICINE CLINIC | Age: 27
End: 2022-11-07
Payer: COMMERCIAL

## 2022-11-07 DIAGNOSIS — Z23 NEED FOR HEPATITIS B BOOSTER VACCINATION: Primary | ICD-10-CM

## 2022-11-07 PROCEDURE — 90471 IMMUNIZATION ADMIN: CPT | Performed by: NURSE PRACTITIONER

## 2022-11-07 PROCEDURE — 90746 HEPB VACCINE 3 DOSE ADULT IM: CPT | Performed by: NURSE PRACTITIONER

## 2022-11-14 ENCOUNTER — HOSPITAL ENCOUNTER (OUTPATIENT)
Age: 27
Discharge: HOME | End: 2022-11-14
Payer: COMMERCIAL

## 2022-11-14 DIAGNOSIS — Z11.1 TUBERCULOSIS SCREENING: ICD-10-CM

## 2022-11-14 PROCEDURE — 86480 TB TEST CELL IMMUN MEASURE: CPT

## 2022-11-17 LAB
QUANTI TB GOLD PLUS: NEGATIVE
QUANTI TB1 MINUS NIL: 0 IU/ML (ref 0–0.34)
QUANTI TB2 MINUS NIL: 0 IU/ML (ref 0–0.34)
QUANTIFERON MITOGEN: >10 IU/ML
QUANTIFERON NIL: 0.01 IU/ML

## 2022-12-09 ENCOUNTER — NURSE ONLY (OUTPATIENT)
Dept: FAMILY MEDICINE CLINIC | Age: 27
End: 2022-12-09
Payer: COMMERCIAL

## 2022-12-09 DIAGNOSIS — Z23 NEED FOR MMR VACCINE: ICD-10-CM

## 2022-12-09 DIAGNOSIS — Z23 NEED FOR HEPATITIS B BOOSTER VACCINATION: Primary | ICD-10-CM

## 2022-12-09 PROCEDURE — 90746 HEPB VACCINE 3 DOSE ADULT IM: CPT | Performed by: NURSE PRACTITIONER

## 2022-12-09 PROCEDURE — 90471 IMMUNIZATION ADMIN: CPT | Performed by: NURSE PRACTITIONER

## 2022-12-09 PROCEDURE — 90472 IMMUNIZATION ADMIN EACH ADD: CPT | Performed by: NURSE PRACTITIONER

## 2022-12-09 PROCEDURE — 90707 MMR VACCINE SC: CPT | Performed by: NURSE PRACTITIONER

## 2023-01-06 ENCOUNTER — TELEMEDICINE (OUTPATIENT)
Dept: FAMILY MEDICINE CLINIC | Age: 28
End: 2023-01-06
Payer: COMMERCIAL

## 2023-01-06 DIAGNOSIS — R09.81 SINUS CONGESTION: ICD-10-CM

## 2023-01-06 DIAGNOSIS — J03.90 ACUTE TONSILLITIS, UNSPECIFIED ETIOLOGY: Primary | ICD-10-CM

## 2023-01-06 PROCEDURE — 99213 OFFICE O/P EST LOW 20 MIN: CPT

## 2023-01-06 RX ORDER — AMOXICILLIN AND CLAVULANATE POTASSIUM 875; 125 MG/1; MG/1
1 TABLET, FILM COATED ORAL 2 TIMES DAILY
Qty: 14 TABLET | Refills: 0 | Status: SHIPPED | OUTPATIENT
Start: 2023-01-06 | End: 2023-01-13

## 2023-01-06 ASSESSMENT — ENCOUNTER SYMPTOMS
RHINORRHEA: 1
RESPIRATORY NEGATIVE: 1
SINUS PRESSURE: 0
SORE THROAT: 1
SINUS PAIN: 0
EYES NEGATIVE: 1
GASTROINTESTINAL NEGATIVE: 1
TROUBLE SWALLOWING: 1

## 2023-01-06 ASSESSMENT — PATIENT HEALTH QUESTIONNAIRE - PHQ9
7. TROUBLE CONCENTRATING ON THINGS, SUCH AS READING THE NEWSPAPER OR WATCHING TELEVISION: 0
9. THOUGHTS THAT YOU WOULD BE BETTER OFF DEAD, OR OF HURTING YOURSELF: 0
5. POOR APPETITE OR OVEREATING: 0
3. TROUBLE FALLING OR STAYING ASLEEP: 0
SUM OF ALL RESPONSES TO PHQ QUESTIONS 1-9: 0
10. IF YOU CHECKED OFF ANY PROBLEMS, HOW DIFFICULT HAVE THESE PROBLEMS MADE IT FOR YOU TO DO YOUR WORK, TAKE CARE OF THINGS AT HOME, OR GET ALONG WITH OTHER PEOPLE: 0
8. MOVING OR SPEAKING SO SLOWLY THAT OTHER PEOPLE COULD HAVE NOTICED. OR THE OPPOSITE, BEING SO FIGETY OR RESTLESS THAT YOU HAVE BEEN MOVING AROUND A LOT MORE THAN USUAL: 0
SUM OF ALL RESPONSES TO PHQ QUESTIONS 1-9: 0
6. FEELING BAD ABOUT YOURSELF - OR THAT YOU ARE A FAILURE OR HAVE LET YOURSELF OR YOUR FAMILY DOWN: 0
2. FEELING DOWN, DEPRESSED OR HOPELESS: 0
SUM OF ALL RESPONSES TO PHQ9 QUESTIONS 1 & 2: 0
4. FEELING TIRED OR HAVING LITTLE ENERGY: 0
1. LITTLE INTEREST OR PLEASURE IN DOING THINGS: 0
SUM OF ALL RESPONSES TO PHQ QUESTIONS 1-9: 0
SUM OF ALL RESPONSES TO PHQ QUESTIONS 1-9: 0

## 2023-01-06 NOTE — PROGRESS NOTES
Dena Gonzalez (:  1995) is a Established patient, here for evaluation of the following:    Assessment & Plan   Below is the assessment and plan developed based on review of pertinent history, physical exam, labs, studies, and medications. 1. Acute tonsillitis, unspecified etiology  -Salt water gargling to help with tonsillitis  -Increase fluid intake  -Augmentin due to breast-feeding  -We did discuss possible Medrol Dosepak but avoided due to breast-feeding  -     amoxicillin-clavulanate (AUGMENTIN) 875-125 MG per tablet; Take 1 tablet by mouth 2 times daily for 7 days, Disp-14 tablet, R-0Normal  2. Sinus congestion  -Increase fluid intake to help with sinus drainage  -Augmentin  -     amoxicillin-clavulanate (AUGMENTIN) 875-125 MG per tablet; Take 1 tablet by mouth 2 times daily for 7 days, Disp-14 tablet, R-0Normal  Return if symptoms worsen or fail to improve. Subjective   Breast-feeding mother seen virtually today for chief complaint of sinus congestion, postnasal drip, sore throat and runny nose that has been ongoing since Monday. Her  was seen recently and he was being treated with Z-Agustin and Medrol Dosepak and feels much better. She denies any fever, chills or body aches. She denies any exposure to COVID or flu. Denies any chest pain or shortness of breath. Main complaint is sinus congestion and sore throat. She is having difficulty with swallowing fluids. She does report some cervical lymphadenopathy and tender to touch since developing illness. She is breast-feeding every 3 hours. Pharyngitis  Associated symptoms include congestion and a sore throat. Pertinent negatives include no chills, fatigue or fever. Review of Systems   Constitutional:  Negative for chills, fatigue and fever. HENT:  Positive for congestion, postnasal drip, rhinorrhea, sore throat and trouble swallowing. Negative for sinus pressure and sinus pain. Eyes: Negative.     Respiratory: Negative. Cardiovascular: Negative. Gastrointestinal: Negative. Endocrine: Negative. Genitourinary: Negative. Musculoskeletal: Negative. Neurological: Negative. Hematological: Negative. Psychiatric/Behavioral: Negative. Objective   Patient-Reported Vitals  No data recorded     Physical Exam         On this date 1/6/2023 I have spent 20 minutes reviewing previous notes, test results and face to face (virtual) with the patient discussing the diagnosis and importance of compliance with the treatment plan as well as documenting on the day of the visit. Ilagerda Dai was evaluated through a synchronous (real-time) audio-video encounter. The patient (or guardian if applicable) is aware that this is a billable service, which includes applicable co-pays. This Virtual Visit was conducted with patient's (and/or legal guardian's) consent. The visit was conducted pursuant to the emergency declaration under the [address] [place] [address], [address] [place] [address] authority and the [employer] and [employer] General Act. Patient identification was verified, and a caregiver was present when appropriate. The patient was located at Home: [address] [place] [address] [place] [id number]. Provider was located at [facility] (Appt Dept): [place] [address] [place] [address],  [address] [place] [address].         --RAJ Kyle - CNP

## 2023-02-07 ENCOUNTER — TELEPHONE (OUTPATIENT)
Dept: FAMILY MEDICINE CLINIC | Age: 28
End: 2023-02-07

## 2023-02-07 DIAGNOSIS — Z11.59 ENCOUNTER FOR SCREENING FOR OTHER VIRAL DISEASES: Primary | ICD-10-CM

## 2023-02-07 NOTE — TELEPHONE ENCOUNTER
----- Message from Malka Miteshgabriel sent at 2/7/2023  8:28 AM EST -----  Subject: Message to Provider    QUESTIONS  Information for Provider? pt req MMR titer to be ordered and needs the one   with numbers to be drawn for school. Pt would like to have it done at   [facility] or [facility] Pt states Laurita Galeas usually orders this for her  ---------------------------------------------------------------------------  --------------  Elias Rosas INFO  [phone]; OK to leave message on voicemail  ---------------------------------------------------------------------------  --------------  SCRIPT ANSWERS  Relationship to Patient?  Self

## 2023-02-07 NOTE — TELEPHONE ENCOUNTER
Can you get the lab order for this for me please. The last one I ordered for her just said immune but not the actual numbers. I have always just ordered the IgG and that has been good enough for schools so I don't know what else her school is needing.

## 2023-02-08 NOTE — TELEPHONE ENCOUNTER
I think I ordered it correctly. She needs to go to the lab to get this done. I had to order it as a miscellaneous test in order to get the numerical values.

## 2023-02-08 NOTE — TELEPHONE ENCOUNTER
Spoke with Phu Zarco at [facility], states you are ordering correctly but that the immune is just kind of the preliminary report. You will need to request the numbers when you put the order in/ when you send to the lab.

## 2023-02-08 NOTE — TELEPHONE ENCOUNTER
Zee Solomon with [facility] calling back to report that the [facility] order # is [id number], may have to be put in as a misc. Order for occupational screening. She is also faxing over something with information that may further help. Will place on Bernice's desk once received.

## 2023-05-02 ENCOUNTER — NURSE ONLY (OUTPATIENT)
Dept: FAMILY MEDICINE CLINIC | Age: 28
End: 2023-05-02
Payer: COMMERCIAL

## 2023-05-02 DIAGNOSIS — Z23 NEED FOR HEPATITIS B VACCINATION: Primary | ICD-10-CM

## 2023-05-02 PROCEDURE — 90746 HEPB VACCINE 3 DOSE ADULT IM: CPT | Performed by: NURSE PRACTITIONER

## 2023-05-02 PROCEDURE — 90471 IMMUNIZATION ADMIN: CPT | Performed by: NURSE PRACTITIONER

## 2023-06-26 ENCOUNTER — NURSE ONLY (OUTPATIENT)
Dept: FAMILY MEDICINE CLINIC | Age: 28
End: 2023-06-26

## 2023-06-26 DIAGNOSIS — Z11.59 ENCOUNTER FOR SCREENING FOR VIRAL DISEASE: ICD-10-CM

## 2023-06-26 LAB — HBV CORE IGM SERPL QL IA: NORMAL

## 2023-06-28 ENCOUNTER — TELEPHONE (OUTPATIENT)
Dept: FAMILY MEDICINE CLINIC | Age: 28
End: 2023-06-28

## 2023-06-28 LAB — HBV CORE AB SERPL QL IA: NEGATIVE

## 2023-07-03 NOTE — PROGRESS NOTES
Letter done and emailed I will put the original in the mail to the patient just in case she does not receive it through her email

## 2024-01-22 ENCOUNTER — APPOINTMENT (OUTPATIENT)
Dept: CT IMAGING | Age: 29
End: 2024-01-22
Payer: COMMERCIAL

## 2024-01-22 ENCOUNTER — HOSPITAL ENCOUNTER (EMERGENCY)
Age: 29
Discharge: HOME | End: 2024-01-22
Attending: STUDENT IN AN ORGANIZED HEALTH CARE EDUCATION/TRAINING PROGRAM
Payer: COMMERCIAL

## 2024-01-22 VITALS
TEMPERATURE: 98.1 F | DIASTOLIC BLOOD PRESSURE: 60 MMHG | HEART RATE: 80 BPM | SYSTOLIC BLOOD PRESSURE: 111 MMHG | BODY MASS INDEX: 41.65 KG/M2 | HEIGHT: 65 IN | OXYGEN SATURATION: 98 % | WEIGHT: 250 LBS | RESPIRATION RATE: 16 BRPM

## 2024-01-22 DIAGNOSIS — K52.9 COLITIS: Primary | ICD-10-CM

## 2024-01-22 LAB
ALBUMIN SERPL-MCNC: 4.2 G/DL (ref 3.4–5)
ALBUMIN/GLOB SERPL: 1.1 {RATIO} (ref 1.1–2.2)
ALP SERPL-CCNC: 82 U/L (ref 40–129)
ALT SERPL-CCNC: 30 U/L (ref 10–40)
ANION GAP SERPL CALCULATED.3IONS-SCNC: 13 MMOL/L (ref 3–16)
AST SERPL-CCNC: 33 U/L (ref 15–37)
BASOPHILS # BLD: 0.1 K/UL (ref 0–0.2)
BASOPHILS NFR BLD: 0.4 %
BILIRUB SERPL-MCNC: 1 MG/DL (ref 0–1)
BILIRUB UR QL STRIP.AUTO: ABNORMAL
BUN SERPL-MCNC: 15 MG/DL (ref 7–20)
CALCIUM SERPL-MCNC: 9.5 MG/DL (ref 8.3–10.6)
CHLORIDE SERPL-SCNC: 103 MMOL/L (ref 99–110)
CLARITY UR: CLEAR
CO2 SERPL-SCNC: 22 MMOL/L (ref 21–32)
COLOR UR: YELLOW
CREAT SERPL-MCNC: 0.6 MG/DL (ref 0.6–1.1)
DEPRECATED RDW RBC AUTO: 13.7 % (ref 12.4–15.4)
EOSINOPHIL # BLD: 0.2 K/UL (ref 0–0.6)
EOSINOPHIL NFR BLD: 1.1 %
GFR SERPLBLD CREATININE-BSD FMLA CKD-EPI: >60 ML/MIN/{1.73_M2}
GLUCOSE SERPL-MCNC: 137 MG/DL (ref 70–99)
GLUCOSE UR STRIP.AUTO-MCNC: 100 MG/DL
HCG SERPL QL: NEGATIVE
HCT VFR BLD AUTO: 38.3 % (ref 36–48)
HGB BLD-MCNC: 12.9 G/DL (ref 12–16)
HGB UR QL STRIP.AUTO: NEGATIVE
KETONES UR STRIP.AUTO-MCNC: NEGATIVE MG/DL
LACTATE BLDV-SCNC: 1.7 MMOL/L (ref 0.4–2)
LEUKOCYTE ESTERASE UR QL STRIP.AUTO: NEGATIVE
LIPASE SERPL-CCNC: 19 U/L (ref 13–60)
LYMPHOCYTES # BLD: 2.4 K/UL (ref 1–5.1)
LYMPHOCYTES NFR BLD: 14.3 %
MAGNESIUM SERPL-MCNC: 1.8 MG/DL (ref 1.8–2.4)
MCH RBC QN AUTO: 26.4 PG (ref 26–34)
MCHC RBC AUTO-ENTMCNC: 33.7 G/DL (ref 31–36)
MCV RBC AUTO: 78.3 FL (ref 80–100)
MONOCYTES # BLD: 0.8 K/UL (ref 0–1.3)
MONOCYTES NFR BLD: 4.6 %
NEUTROPHILS # BLD: 13.3 K/UL (ref 1.7–7.7)
NEUTROPHILS NFR BLD: 79.6 %
NITRITE UR QL STRIP.AUTO: NEGATIVE
PH UR STRIP.AUTO: 5.5 [PH] (ref 5–8)
PLATELET # BLD AUTO: 288 K/UL (ref 135–450)
PMV BLD AUTO: 8.4 FL (ref 5–10.5)
POTASSIUM SERPL-SCNC: 3.4 MMOL/L (ref 3.5–5.1)
PROT SERPL-MCNC: 7.9 G/DL (ref 6.4–8.2)
PROT UR STRIP.AUTO-MCNC: NEGATIVE MG/DL
RBC # BLD AUTO: 4.89 M/UL (ref 4–5.2)
SODIUM SERPL-SCNC: 138 MMOL/L (ref 136–145)
SP GR UR STRIP.AUTO: >=1.03 (ref 1–1.03)
UA DIPSTICK W REFLEX MICRO PNL UR: ABNORMAL
URN SPEC COLLECT METH UR: ABNORMAL
UROBILINOGEN UR STRIP-ACNC: 1 E.U./DL
WBC # BLD AUTO: 16.8 K/UL (ref 4–11)

## 2024-01-22 PROCEDURE — 81003 URINALYSIS AUTO W/O SCOPE: CPT

## 2024-01-22 PROCEDURE — 74177 CT ABD & PELVIS W/CONTRAST: CPT

## 2024-01-22 PROCEDURE — 6360000004 HC RX CONTRAST MEDICATION: Performed by: STUDENT IN AN ORGANIZED HEALTH CARE EDUCATION/TRAINING PROGRAM

## 2024-01-22 PROCEDURE — 84703 CHORIONIC GONADOTROPIN ASSAY: CPT

## 2024-01-22 PROCEDURE — 96361 HYDRATE IV INFUSION ADD-ON: CPT

## 2024-01-22 PROCEDURE — 2580000003 HC RX 258: Performed by: STUDENT IN AN ORGANIZED HEALTH CARE EDUCATION/TRAINING PROGRAM

## 2024-01-22 PROCEDURE — 96374 THER/PROPH/DIAG INJ IV PUSH: CPT

## 2024-01-22 PROCEDURE — 85025 COMPLETE CBC W/AUTO DIFF WBC: CPT

## 2024-01-22 PROCEDURE — 80053 COMPREHEN METABOLIC PANEL: CPT

## 2024-01-22 PROCEDURE — 6360000002 HC RX W HCPCS: Performed by: STUDENT IN AN ORGANIZED HEALTH CARE EDUCATION/TRAINING PROGRAM

## 2024-01-22 PROCEDURE — 99285 EMERGENCY DEPT VISIT HI MDM: CPT

## 2024-01-22 PROCEDURE — 83735 ASSAY OF MAGNESIUM: CPT

## 2024-01-22 PROCEDURE — 83605 ASSAY OF LACTIC ACID: CPT

## 2024-01-22 PROCEDURE — 83690 ASSAY OF LIPASE: CPT

## 2024-01-22 PROCEDURE — 36415 COLL VENOUS BLD VENIPUNCTURE: CPT

## 2024-01-22 RX ORDER — ONDANSETRON 2 MG/ML
4 INJECTION INTRAMUSCULAR; INTRAVENOUS ONCE
Status: DISCONTINUED | OUTPATIENT
Start: 2024-01-22 | End: 2024-01-22

## 2024-01-22 RX ORDER — AMOXICILLIN AND CLAVULANATE POTASSIUM 875; 125 MG/1; MG/1
1 TABLET, FILM COATED ORAL 2 TIMES DAILY
Qty: 14 TABLET | Refills: 0 | Status: SHIPPED | OUTPATIENT
Start: 2024-01-22 | End: 2024-01-29

## 2024-01-22 RX ORDER — KETOROLAC TROMETHAMINE 15 MG/ML
15 INJECTION, SOLUTION INTRAMUSCULAR; INTRAVENOUS ONCE
Status: COMPLETED | OUTPATIENT
Start: 2024-01-22 | End: 2024-01-22

## 2024-01-22 RX ORDER — MORPHINE SULFATE 4 MG/ML
4 INJECTION, SOLUTION INTRAMUSCULAR; INTRAVENOUS ONCE
Status: DISCONTINUED | OUTPATIENT
Start: 2024-01-22 | End: 2024-01-22

## 2024-01-22 RX ORDER — SODIUM CHLORIDE, SODIUM LACTATE, POTASSIUM CHLORIDE, CALCIUM CHLORIDE 600; 310; 30; 20 MG/100ML; MG/100ML; MG/100ML; MG/100ML
INJECTION, SOLUTION INTRAVENOUS ONCE
Status: DISCONTINUED | OUTPATIENT
Start: 2024-01-22 | End: 2024-01-22 | Stop reason: HOSPADM

## 2024-01-22 RX ORDER — SODIUM CHLORIDE, SODIUM LACTATE, POTASSIUM CHLORIDE, CALCIUM CHLORIDE 600; 310; 30; 20 MG/100ML; MG/100ML; MG/100ML; MG/100ML
INJECTION, SOLUTION INTRAVENOUS ONCE
Status: COMPLETED | OUTPATIENT
Start: 2024-01-22 | End: 2024-01-22

## 2024-01-22 RX ADMIN — SODIUM CHLORIDE, POTASSIUM CHLORIDE, SODIUM LACTATE AND CALCIUM CHLORIDE: 600; 310; 30; 20 INJECTION, SOLUTION INTRAVENOUS at 02:48

## 2024-01-22 RX ADMIN — IOMEPROL INJECTION 75 ML: 714 INJECTION, SOLUTION INTRAVASCULAR at 03:25

## 2024-01-22 RX ADMIN — KETOROLAC TROMETHAMINE 15 MG: 15 INJECTION, SOLUTION INTRAMUSCULAR; INTRAVENOUS at 02:48

## 2024-01-22 ASSESSMENT — PAIN DESCRIPTION - ORIENTATION: ORIENTATION: LEFT;LOWER

## 2024-01-22 ASSESSMENT — PAIN DESCRIPTION - PAIN TYPE: TYPE: ACUTE PAIN

## 2024-01-22 ASSESSMENT — PAIN SCALES - GENERAL
PAINLEVEL_OUTOF10: 0
PAINLEVEL_OUTOF10: 2

## 2024-01-22 ASSESSMENT — PAIN DESCRIPTION - FREQUENCY: FREQUENCY: INTERMITTENT

## 2024-01-22 ASSESSMENT — PAIN DESCRIPTION - DESCRIPTORS: DESCRIPTORS: DULL;ACHING

## 2024-01-22 ASSESSMENT — PAIN - FUNCTIONAL ASSESSMENT: PAIN_FUNCTIONAL_ASSESSMENT: 0-10

## 2024-01-22 ASSESSMENT — PAIN DESCRIPTION - LOCATION: LOCATION: ABDOMEN

## 2024-01-22 NOTE — DISCHARGE INSTRUCTIONS
Follow-up with GI and urinary doctor soon as able for reevaluation.  Take antibiotics as prescribed.  Return to emergency department for increased diarrhea, blood in your stools, lightheadedness or dizziness, fevers, worsening uncontrolled pain or any new change or worsening symptoms were always here for reevaluation never hesitate to return.

## 2024-01-22 NOTE — ED PROVIDER NOTES
Emergency Department Encounter    Patient: Kaitlyn Shahid  MRN: [id number]  : 1995  Date of Evaluation: 2024  ED Provider:  Epifanio Gomez MD    Triage Chief Complaint:   Abdominal Pain (Pt c/o LLQ abd pain, Pt reports N/V/D, 1 episode of bloody stool. Pt's  had GI bug earlier in the week. )    [place]:  Kaitlyn Shahid is a 28 y.o. female with history seen below presenting with left-sided abdominal pain.  Patient states for the past 2 days she has had left-sided abdominal pain with multiple episodes of diarrhea.  Patient states she did have 1 episode of bloody diarrhea but has not had any blood in her diarrhea since then.  States she did have 1 episode of nonbilious nonbloody vomiting.  States the pain is 2 at baseline with intermittent episodes of worsening pain that are 10 out of 10.  Patient states the pain is stabbing, worse with palpation without relieving factors.  Denies vaginal bleeding or discharge.  Denies urinary symptoms include dysuria, increased frequency, urgency, hematuria.  Denies recent falls or trauma.  Denies neck or back pain.  Denies chest pain or shortness of breath lightheadedness or dizziness.  Denies fevers.  Denies URI symptoms, nasal congestion, cough, sore throat, otalgia.  Has had a  in the past denies any other abdominal surgeries in the past.  Denies alcohol or drug use    ROS - see HPI, below listed is current ROS at time of my eval:  At least 14 systems reviewed, negative other than HPI    Past Medical History:   Diagnosis Date    Abnormal Pap smear of cervix     Anemia     TB (tuberculosis)     completed 6 months oral therapy     Past Surgical History:   Procedure Laterality Date    ADENOIDECTOMY       SECTION N/A 2022     SECTION performed by Phoebe Stover MD at [facility] L&D OR     History reviewed. No pertinent family history.  Social History     Socioeconomic History    Marital status:      Spouse name: Not on file    Number of

## 2024-12-24 ENCOUNTER — OFFICE VISIT (OUTPATIENT)
Dept: FAMILY MEDICINE CLINIC | Age: 29
End: 2024-12-24

## 2024-12-24 VITALS
WEIGHT: 259 LBS | TEMPERATURE: 100.6 F | BODY MASS INDEX: 43.1 KG/M2 | DIASTOLIC BLOOD PRESSURE: 80 MMHG | SYSTOLIC BLOOD PRESSURE: 122 MMHG

## 2024-12-24 DIAGNOSIS — R05.1 ACUTE COUGH: ICD-10-CM

## 2024-12-24 DIAGNOSIS — J10.1 INFLUENZA A: Primary | ICD-10-CM

## 2024-12-24 DIAGNOSIS — R52 GENERALIZED BODY ACHES: ICD-10-CM

## 2024-12-24 DIAGNOSIS — R50.9 FEVER, UNSPECIFIED FEVER CAUSE: ICD-10-CM

## 2024-12-24 LAB
INFLUENZA A ANTIGEN, POC: POSITIVE
INFLUENZA B ANTIGEN, POC: NEGATIVE
LOT EXPIRE DATE: ABNORMAL
LOT KIT NUMBER: ABNORMAL
RSV RNA: NORMAL
S PYO AG THROAT QL: NORMAL
SARS-COV-2, POC: ABNORMAL
VALID INTERNAL CONTROL: ABNORMAL
VENDOR AND KIT NAME POC: ABNORMAL

## 2024-12-24 RX ORDER — IBUPROFEN 600 MG/1
600 TABLET, FILM COATED ORAL EVERY 6 HOURS
COMMUNITY
Start: 2024-12-04

## 2024-12-24 RX ORDER — OSELTAMIVIR PHOSPHATE 75 MG/1
75 CAPSULE ORAL 2 TIMES DAILY
Qty: 10 CAPSULE | Refills: 0 | Status: SHIPPED | OUTPATIENT
Start: 2024-12-24 | End: 2024-12-29

## 2024-12-24 SDOH — ECONOMIC STABILITY: FOOD INSECURITY: WITHIN THE PAST 12 MONTHS, THE FOOD YOU BOUGHT JUST DIDN'T LAST AND YOU DIDN'T HAVE MONEY TO GET MORE.: NEVER TRUE

## 2024-12-24 SDOH — ECONOMIC STABILITY: FOOD INSECURITY: WITHIN THE PAST 12 MONTHS, YOU WORRIED THAT YOUR FOOD WOULD RUN OUT BEFORE YOU GOT MONEY TO BUY MORE.: NEVER TRUE

## 2024-12-24 SDOH — ECONOMIC STABILITY: INCOME INSECURITY: HOW HARD IS IT FOR YOU TO PAY FOR THE VERY BASICS LIKE FOOD, HOUSING, MEDICAL CARE, AND HEATING?: NOT HARD AT ALL

## 2024-12-24 ASSESSMENT — PATIENT HEALTH QUESTIONNAIRE - PHQ9
1. LITTLE INTEREST OR PLEASURE IN DOING THINGS: NOT AT ALL
9. THOUGHTS THAT YOU WOULD BE BETTER OFF DEAD, OR OF HURTING YOURSELF: NOT AT ALL
7. TROUBLE CONCENTRATING ON THINGS, SUCH AS READING THE NEWSPAPER OR WATCHING TELEVISION: NOT AT ALL
2. FEELING DOWN, DEPRESSED OR HOPELESS: NOT AT ALL
SUM OF ALL RESPONSES TO PHQ9 QUESTIONS 1 & 2: 0
4. FEELING TIRED OR HAVING LITTLE ENERGY: NOT AT ALL
SUM OF ALL RESPONSES TO PHQ QUESTIONS 1-9: 0
SUM OF ALL RESPONSES TO PHQ QUESTIONS 1-9: 0
8. MOVING OR SPEAKING SO SLOWLY THAT OTHER PEOPLE COULD HAVE NOTICED. OR THE OPPOSITE, BEING SO FIGETY OR RESTLESS THAT YOU HAVE BEEN MOVING AROUND A LOT MORE THAN USUAL: NOT AT ALL
3. TROUBLE FALLING OR STAYING ASLEEP: NOT AT ALL
6. FEELING BAD ABOUT YOURSELF - OR THAT YOU ARE A FAILURE OR HAVE LET YOURSELF OR YOUR FAMILY DOWN: NOT AT ALL
SUM OF ALL RESPONSES TO PHQ QUESTIONS 1-9: 0
5. POOR APPETITE OR OVEREATING: NOT AT ALL
SUM OF ALL RESPONSES TO PHQ QUESTIONS 1-9: 0
10. IF YOU CHECKED OFF ANY PROBLEMS, HOW DIFFICULT HAVE THESE PROBLEMS MADE IT FOR YOU TO DO YOUR WORK, TAKE CARE OF THINGS AT HOME, OR GET ALONG WITH OTHER PEOPLE: NOT DIFFICULT AT ALL

## 2024-12-24 NOTE — PROGRESS NOTES
Lymphadenopathy:      Head:      Right side of head: Tonsillar adenopathy present. No submental, submandibular, preauricular, posterior auricular or occipital adenopathy.      Left side of head: Tonsillar adenopathy present. No submental, submandibular, preauricular, posterior auricular or occipital adenopathy.      Cervical: No cervical adenopathy.   Skin:     General: Skin is warm and dry.      Capillary Refill: Capillary refill takes less than 2 seconds.   Neurological:      General: No focal deficit present.      Mental Status: She is alert and oriented to person, place, and time.   Psychiatric:         Mood and Affect: Mood normal.         Behavior: Behavior normal.           Patient's questions answered and concerns addressed. Patient agrees to plan of care.    Patient should call the office immediately with new or ongoing signs or symptoms or worsening, or proceed to the emergency room.  No changes in past medical history, past surgical history, social history, or family history were noted during the patient encounter unless specifically listed above.  All updates of past medical history, past surgical history, social history, or family history were reviewed personally by me during the office visit.  All problems listed in the assessment are stable unless noted otherwise.  Medication profile reviewed personally by me during the visit.  Medication side effects and possible impairments from medications were discussed as applicable.     Unless stated otherwise, we will continue current medications as listed in the medication review report contained in the patient's medical record.        Electronically signed by RAJ Gupta CNP on 12/24/2024 at 8:39 AM   164.9

## 2025-08-18 ENCOUNTER — PATIENT MESSAGE (OUTPATIENT)
Dept: FAMILY MEDICINE CLINIC | Age: 30
End: 2025-08-18

## 2025-08-18 DIAGNOSIS — Z87.898 HX OF MOTION SICKNESS: Primary | ICD-10-CM

## 2025-08-18 RX ORDER — SCOPOLAMINE 1 MG/3D
1 PATCH, EXTENDED RELEASE TRANSDERMAL
Qty: 3 PATCH | Refills: 0 | Status: SHIPPED | OUTPATIENT
Start: 2025-08-18

## (undated) DEVICE — Device

## (undated) DEVICE — 3M™ STERI-STRIP™ COMPOUND BENZOIN TINCTURE 40 BAGS/CARTON 4 CARTONS/CASE C1544: Brand: 3M™ STERI-STRIP™

## (undated) DEVICE — CHLORAPREP 26ML ORANGE

## (undated) DEVICE — SUTURE VCRL SZ 0 L36IN ABSRB UD L36MM CT-1 1/2 CIR J946H

## (undated) DEVICE — S/USE RESUS KIT W/O MASK (10): Brand: FISHER & PAYKEL HEALTHCARE

## (undated) DEVICE — SOLUTION IV IRRIG POUR BRL 0.9% SODIUM CHL 2F7124

## (undated) DEVICE — SUTURE VCRL SZ 3-0 L36IN ABSRB UD L36MM CT-1 1/2 CIR J944H

## (undated) DEVICE — GARMENT COMPR L FOR 23IN CALF FLOTRN

## (undated) DEVICE — DRESSING COMP IS W4XL10IN PD W2XL8IN CNTCT LAYR ADH

## (undated) DEVICE — PAD,NON-ADHERENT,3X8,STERILE,LF,1/PK: Brand: MEDLINE

## (undated) DEVICE — SUTURE ABSORBABLE BRAIDED 2-0 CT-1 27 IN UD VICRYL J259H

## (undated) DEVICE — GLOVE SURG SZ 6 THK91MIL LTX FREE SYN POLYISOPRENE ANTI

## (undated) DEVICE — TRAY URIN CATH 16FR DRNGE BG STATLOK STBL DEV F SURSTP

## (undated) DEVICE — SUTURE VCRL SZ 0 L36IN ABSRB UD L48MM CTX 1/2 CIR J978H